# Patient Record
Sex: FEMALE | Race: BLACK OR AFRICAN AMERICAN | NOT HISPANIC OR LATINO | Employment: FULL TIME | URBAN - METROPOLITAN AREA
[De-identification: names, ages, dates, MRNs, and addresses within clinical notes are randomized per-mention and may not be internally consistent; named-entity substitution may affect disease eponyms.]

---

## 2017-01-17 ENCOUNTER — CLINICAL SUPPORT (OUTPATIENT)
Dept: SMOKING CESSATION | Facility: CLINIC | Age: 52
End: 2017-01-17
Payer: COMMERCIAL

## 2017-01-17 DIAGNOSIS — F17.200 NICOTINE DEPENDENCE: Primary | ICD-10-CM

## 2017-01-17 PROCEDURE — 99407 BEHAV CHNG SMOKING > 10 MIN: CPT | Mod: S$GLB,,,

## 2017-08-04 PROBLEM — 275978004 SCREENING FOR MALIGNANT NEOPLASM OF COLON: Status: ACTIVE | Noted: 2017-08-04

## 2017-08-04 PROBLEM — 301715003 LEFT UPPER QUADRANT PAIN: Status: ACTIVE | Noted: 2017-08-04

## 2017-08-04 PROBLEM — 40739000 DYSPHAGIA: Status: ACTIVE | Noted: 2017-08-04

## 2022-04-30 ENCOUNTER — TELEPHONE ENCOUNTER (OUTPATIENT)
Dept: URBAN - METROPOLITAN AREA CLINIC 121 | Facility: CLINIC | Age: 57
End: 2022-04-30

## 2022-04-30 RX ORDER — PHENYTOIN SODIUM EXTENDED 100 MG
CAPSULE ORAL
OUTPATIENT
Start: 2017-08-04

## 2022-04-30 RX ORDER — OMEPRAZOLE 40 MG/1
TAKE 1 CAPSULE PO QD CAPSULE, DELAYED RELEASE ORAL
OUTPATIENT
Start: 2017-08-04

## 2022-05-01 ENCOUNTER — TELEPHONE ENCOUNTER (OUTPATIENT)
Dept: URBAN - METROPOLITAN AREA CLINIC 121 | Facility: CLINIC | Age: 57
End: 2022-05-01

## 2022-05-01 RX ORDER — PHENYTOIN SODIUM EXTENDED 100 MG
CAPSULE ORAL
Status: ACTIVE | COMMUNITY
Start: 2017-08-04

## 2022-05-01 RX ORDER — OMEPRAZOLE 40 MG/1
TAKE 1 CAPSULE PO QD CAPSULE, DELAYED RELEASE ORAL
Status: ACTIVE | COMMUNITY
Start: 2017-08-04

## 2022-05-01 RX ORDER — SODIUM SULFATE, POTASSIUM SULFATE, MAGNESIUM SULFATE 17.5; 3.13; 1.6 G/ML; G/ML; G/ML
MIX AND USE AS DIRECTED SOLUTION, CONCENTRATE ORAL
Status: ACTIVE | COMMUNITY
Start: 2017-08-04

## 2022-05-01 RX ORDER — DEXTROMETHORPHAN POLISTIREX 30 MG/5ML
SUSPENSION, EXTENDED RELEASE ORAL
Status: ACTIVE | COMMUNITY
Start: 2017-08-04

## 2022-10-06 ENCOUNTER — IMMUNIZATION (OUTPATIENT)
Dept: INTERNAL MEDICINE | Facility: CLINIC | Age: 57
End: 2022-10-06
Payer: COMMERCIAL

## 2022-10-06 PROCEDURE — 90686 IIV4 VACC NO PRSV 0.5 ML IM: CPT | Mod: PBBFAC

## 2022-10-24 ENCOUNTER — OFFICE VISIT (OUTPATIENT)
Dept: INTERNAL MEDICINE | Facility: CLINIC | Age: 57
End: 2022-10-24
Payer: COMMERCIAL

## 2022-10-24 ENCOUNTER — LAB VISIT (OUTPATIENT)
Dept: LAB | Facility: HOSPITAL | Age: 57
End: 2022-10-24
Payer: COMMERCIAL

## 2022-10-24 ENCOUNTER — IMMUNIZATION (OUTPATIENT)
Dept: INTERNAL MEDICINE | Facility: CLINIC | Age: 57
End: 2022-10-24
Payer: COMMERCIAL

## 2022-10-24 VITALS
DIASTOLIC BLOOD PRESSURE: 70 MMHG | HEART RATE: 81 BPM | HEIGHT: 61 IN | WEIGHT: 194 LBS | OXYGEN SATURATION: 97 % | BODY MASS INDEX: 36.63 KG/M2 | SYSTOLIC BLOOD PRESSURE: 100 MMHG

## 2022-10-24 DIAGNOSIS — Z00.00 ENCOUNTER FOR HEALTH MAINTENANCE EXAMINATION: Primary | ICD-10-CM

## 2022-10-24 DIAGNOSIS — Z12.31 ENCOUNTER FOR SCREENING MAMMOGRAM FOR MALIGNANT NEOPLASM OF BREAST: ICD-10-CM

## 2022-10-24 DIAGNOSIS — Z12.11 COLON CANCER SCREENING: ICD-10-CM

## 2022-10-24 DIAGNOSIS — Z13.220 SCREENING CHOLESTEROL LEVEL: ICD-10-CM

## 2022-10-24 DIAGNOSIS — Z11.4 ENCOUNTER FOR SCREENING FOR HIV: ICD-10-CM

## 2022-10-24 DIAGNOSIS — Z76.89 ENCOUNTER TO ESTABLISH CARE WITH NEW DOCTOR: ICD-10-CM

## 2022-10-24 DIAGNOSIS — Z23 NEED FOR SHINGLES VACCINE: ICD-10-CM

## 2022-10-24 DIAGNOSIS — Z01.89 ROUTINE LAB DRAW: ICD-10-CM

## 2022-10-24 DIAGNOSIS — I10 HTN (HYPERTENSION), BENIGN: ICD-10-CM

## 2022-10-24 DIAGNOSIS — Z01.419 PAP TEST, AS PART OF ROUTINE GYNECOLOGICAL EXAMINATION: ICD-10-CM

## 2022-10-24 DIAGNOSIS — Z23 NEED FOR VACCINATION: Primary | ICD-10-CM

## 2022-10-24 DIAGNOSIS — R56.9 SEIZURES: ICD-10-CM

## 2022-10-24 DIAGNOSIS — Z23 NEED FOR TDAP VACCINATION: ICD-10-CM

## 2022-10-24 DIAGNOSIS — E78.2 MIXED HYPERLIPIDEMIA: ICD-10-CM

## 2022-10-24 DIAGNOSIS — Z23 NEED FOR PNEUMOCOCCAL VACCINATION: ICD-10-CM

## 2022-10-24 DIAGNOSIS — Z11.59 ENCOUNTER FOR HEPATITIS C SCREENING TEST FOR LOW RISK PATIENT: ICD-10-CM

## 2022-10-24 DIAGNOSIS — E66.9 OBESITY (BMI 35.0-39.9 WITHOUT COMORBIDITY): ICD-10-CM

## 2022-10-24 DIAGNOSIS — R13.19 OTHER DYSPHAGIA: ICD-10-CM

## 2022-10-24 DIAGNOSIS — K21.9 GASTROESOPHAGEAL REFLUX DISEASE WITHOUT ESOPHAGITIS: ICD-10-CM

## 2022-10-24 LAB
ALBUMIN SERPL BCP-MCNC: 4.4 G/DL (ref 3.5–5.2)
ALP SERPL-CCNC: 122 U/L (ref 55–135)
ALT SERPL W/O P-5'-P-CCNC: 26 U/L (ref 10–44)
ANION GAP SERPL CALC-SCNC: 13 MMOL/L (ref 8–16)
AST SERPL-CCNC: 25 U/L (ref 10–40)
BASOPHILS # BLD AUTO: 0.03 K/UL (ref 0–0.2)
BASOPHILS NFR BLD: 0.7 % (ref 0–1.9)
BILIRUB SERPL-MCNC: 0.2 MG/DL (ref 0.1–1)
BUN SERPL-MCNC: 15 MG/DL (ref 6–20)
CALCIUM SERPL-MCNC: 10 MG/DL (ref 8.7–10.5)
CHLORIDE SERPL-SCNC: 105 MMOL/L (ref 95–110)
CHOLEST SERPL-MCNC: 180 MG/DL (ref 120–199)
CHOLEST/HDLC SERPL: 2.6 {RATIO} (ref 2–5)
CO2 SERPL-SCNC: 23 MMOL/L (ref 23–29)
CREAT SERPL-MCNC: 0.7 MG/DL (ref 0.5–1.4)
DIFFERENTIAL METHOD: NORMAL
EOSINOPHIL # BLD AUTO: 0.1 K/UL (ref 0–0.5)
EOSINOPHIL NFR BLD: 1.1 % (ref 0–8)
ERYTHROCYTE [DISTWIDTH] IN BLOOD BY AUTOMATED COUNT: 12.5 % (ref 11.5–14.5)
EST. GFR  (NO RACE VARIABLE): >60 ML/MIN/1.73 M^2
GLUCOSE SERPL-MCNC: 94 MG/DL (ref 70–110)
HCT VFR BLD AUTO: 37.8 % (ref 37–48.5)
HCV AB SERPL QL IA: NORMAL
HDLC SERPL-MCNC: 69 MG/DL (ref 40–75)
HDLC SERPL: 38.3 % (ref 20–50)
HGB BLD-MCNC: 12.8 G/DL (ref 12–16)
IMM GRANULOCYTES # BLD AUTO: 0.01 K/UL (ref 0–0.04)
IMM GRANULOCYTES NFR BLD AUTO: 0.2 % (ref 0–0.5)
LDLC SERPL CALC-MCNC: 92.8 MG/DL (ref 63–159)
LYMPHOCYTES # BLD AUTO: 1.8 K/UL (ref 1–4.8)
LYMPHOCYTES NFR BLD: 41.4 % (ref 18–48)
MCH RBC QN AUTO: 30.5 PG (ref 27–31)
MCHC RBC AUTO-ENTMCNC: 33.9 G/DL (ref 32–36)
MCV RBC AUTO: 90 FL (ref 82–98)
MONOCYTES # BLD AUTO: 0.3 K/UL (ref 0.3–1)
MONOCYTES NFR BLD: 6.1 % (ref 4–15)
NEUTROPHILS # BLD AUTO: 2.2 K/UL (ref 1.8–7.7)
NEUTROPHILS NFR BLD: 50.5 % (ref 38–73)
NONHDLC SERPL-MCNC: 111 MG/DL
NRBC BLD-RTO: 0 /100 WBC
PLATELET # BLD AUTO: 245 K/UL (ref 150–450)
PMV BLD AUTO: 12.5 FL (ref 9.2–12.9)
POTASSIUM SERPL-SCNC: 3.3 MMOL/L (ref 3.5–5.1)
PROT SERPL-MCNC: 7.7 G/DL (ref 6–8.4)
RBC # BLD AUTO: 4.2 M/UL (ref 4–5.4)
SODIUM SERPL-SCNC: 141 MMOL/L (ref 136–145)
TRIGL SERPL-MCNC: 91 MG/DL (ref 30–150)
TSH SERPL DL<=0.005 MIU/L-ACNC: 0.48 UIU/ML (ref 0.4–4)
WBC # BLD AUTO: 4.4 K/UL (ref 3.9–12.7)

## 2022-10-24 PROCEDURE — 0124A PR IMMUNIZ ADMIN, SARS-COV- 2 COVID-19 VACCINE, 30MCG/0.3ML, BIVALENT, BOOSTER DOSE: CPT | Mod: PBBFAC | Performed by: NURSE PRACTITIONER

## 2022-10-24 PROCEDURE — 1160F RVW MEDS BY RX/DR IN RCRD: CPT | Mod: CPTII,S$GLB,, | Performed by: NURSE PRACTITIONER

## 2022-10-24 PROCEDURE — 3074F SYST BP LT 130 MM HG: CPT | Mod: CPTII,S$GLB,, | Performed by: NURSE PRACTITIONER

## 2022-10-24 PROCEDURE — 1159F PR MEDICATION LIST DOCUMENTED IN MEDICAL RECORD: ICD-10-PCS | Mod: CPTII,S$GLB,, | Performed by: NURSE PRACTITIONER

## 2022-10-24 PROCEDURE — 99215 OFFICE O/P EST HI 40 MIN: CPT | Mod: PBBFAC | Performed by: NURSE PRACTITIONER

## 2022-10-24 PROCEDURE — 99999 PR PBB SHADOW E&M-EST. PATIENT-LVL V: ICD-10-PCS | Mod: PBBFAC,,, | Performed by: NURSE PRACTITIONER

## 2022-10-24 PROCEDURE — 1160F PR REVIEW ALL MEDS BY PRESCRIBER/CLIN PHARMACIST DOCUMENTED: ICD-10-PCS | Mod: CPTII,S$GLB,, | Performed by: NURSE PRACTITIONER

## 2022-10-24 PROCEDURE — 85025 COMPLETE CBC W/AUTO DIFF WBC: CPT | Performed by: NURSE PRACTITIONER

## 2022-10-24 PROCEDURE — 3074F PR MOST RECENT SYSTOLIC BLOOD PRESSURE < 130 MM HG: ICD-10-PCS | Mod: CPTII,S$GLB,, | Performed by: NURSE PRACTITIONER

## 2022-10-24 PROCEDURE — 99386 PREV VISIT NEW AGE 40-64: CPT | Mod: S$GLB,,, | Performed by: NURSE PRACTITIONER

## 2022-10-24 PROCEDURE — 80061 LIPID PANEL: CPT | Performed by: NURSE PRACTITIONER

## 2022-10-24 PROCEDURE — 84443 ASSAY THYROID STIM HORMONE: CPT | Performed by: NURSE PRACTITIONER

## 2022-10-24 PROCEDURE — 80053 COMPREHEN METABOLIC PANEL: CPT | Performed by: NURSE PRACTITIONER

## 2022-10-24 PROCEDURE — 86803 HEPATITIS C AB TEST: CPT | Performed by: NURSE PRACTITIONER

## 2022-10-24 PROCEDURE — 87389 HIV-1 AG W/HIV-1&-2 AB AG IA: CPT | Performed by: NURSE PRACTITIONER

## 2022-10-24 PROCEDURE — 99999 PR PBB SHADOW E&M-EST. PATIENT-LVL V: CPT | Mod: PBBFAC,,, | Performed by: NURSE PRACTITIONER

## 2022-10-24 PROCEDURE — 91312: CPT | Mod: PBBFAC | Performed by: NURSE PRACTITIONER

## 2022-10-24 PROCEDURE — 99386 PR PREVENTIVE VISIT,NEW,40-64: ICD-10-PCS | Mod: S$GLB,,, | Performed by: NURSE PRACTITIONER

## 2022-10-24 PROCEDURE — 1159F MED LIST DOCD IN RCRD: CPT | Mod: CPTII,S$GLB,, | Performed by: NURSE PRACTITIONER

## 2022-10-24 PROCEDURE — 3078F DIAST BP <80 MM HG: CPT | Mod: CPTII,S$GLB,, | Performed by: NURSE PRACTITIONER

## 2022-10-24 PROCEDURE — 3078F PR MOST RECENT DIASTOLIC BLOOD PRESSURE < 80 MM HG: ICD-10-PCS | Mod: CPTII,S$GLB,, | Performed by: NURSE PRACTITIONER

## 2022-10-24 PROCEDURE — 0124A COVID-19, MRNA, LNP-S, BIVALENT BOOSTER, PF, 30 MCG/0.3 ML DOSE: CPT | Mod: CV19,PBBFAC | Performed by: INTERNAL MEDICINE

## 2022-10-24 PROCEDURE — 91312 COVID-19, MRNA, LNP-S, BIVALENT BOOSTER, PF, 30 MCG/0.3 ML DOSE: CPT | Mod: PBBFAC

## 2022-10-24 PROCEDURE — 36415 COLL VENOUS BLD VENIPUNCTURE: CPT | Performed by: NURSE PRACTITIONER

## 2022-10-24 RX ORDER — ATORVASTATIN CALCIUM 40 MG/1
40 TABLET, FILM COATED ORAL DAILY
Qty: 90 TABLET | Refills: 3 | Status: SHIPPED | OUTPATIENT
Start: 2022-10-24 | End: 2022-12-14 | Stop reason: SDUPTHER

## 2022-10-24 RX ORDER — PHENYTOIN SODIUM 100 MG/1
100 CAPSULE, EXTENDED RELEASE ORAL 3 TIMES DAILY
Qty: 270 CAPSULE | Refills: 3 | Status: SHIPPED | OUTPATIENT
Start: 2022-10-24 | End: 2022-12-14 | Stop reason: SDUPTHER

## 2022-10-24 RX ORDER — ATORVASTATIN CALCIUM 40 MG/1
40 TABLET, FILM COATED ORAL DAILY
COMMUNITY
Start: 2022-10-14 | End: 2022-10-24 | Stop reason: SDUPTHER

## 2022-10-24 RX ORDER — ESOMEPRAZOLE MAGNESIUM 40 MG/1
40 CAPSULE, DELAYED RELEASE ORAL
Qty: 90 CAPSULE | Refills: 3 | Status: SHIPPED | OUTPATIENT
Start: 2022-10-24 | End: 2023-03-10 | Stop reason: SDUPTHER

## 2022-10-24 RX ORDER — CHLORTHALIDONE 25 MG/1
25 TABLET ORAL DAILY
COMMUNITY
Start: 2022-09-17 | End: 2022-10-24 | Stop reason: SDUPTHER

## 2022-10-24 RX ORDER — CHLORTHALIDONE 25 MG/1
25 TABLET ORAL DAILY
Qty: 90 TABLET | Refills: 3 | Status: SHIPPED | OUTPATIENT
Start: 2022-10-24 | End: 2022-12-14 | Stop reason: SDUPTHER

## 2022-10-24 NOTE — PATIENT INSTRUCTIONS
Fasting lab orders, will call with results, if results ok, RTC in 1 yr for annual or sooner prn with one of MDs I work with who can be your new PCP: Dr. Antolin Murdock, Dr. Shukri Fields, Dr. Sarai Corrales, Dr. Sandrita Hussein, Dr. Fito Gudino, or  Dr. Howard Mendoza, or Dr. Sharda Morrison    GYN referral for pap smear    Mammogram ordered    Endoscopy will contact you to schedule your colonoscopy    PCV 20, shingles, and COVID booster discussed will get all three    Refilled requested meds    Neurology referral for seizures    Gastroenterology referral for trouble swallowing

## 2022-10-24 NOTE — PROGRESS NOTES
Subjective:       Patient ID: Adri Liriano is a 57 y.o. female.    Chief Complaint: Annual Exam    Pt new to me, here for an annual    Previous PCP was in [place]    Has had issues with constipation relative to her dilantin. Also swallowing issues. States she had an EGD 3 yrs ago in [place]. Last C-scope 5 yrs ago per pt, told to repeat in 5 yrs.    Needs refills on daily maintenance meds for cholesterol, HTN, GERD, and seizures    Needs neurology referral, was seeing a neurologist for her seizure management [place], no recent seizures, stable on dilantin    Review of Systems   Constitutional:  Negative for activity change, appetite change and unexpected weight change.   HENT:  Positive for trouble swallowing. Negative for dental problem and hearing loss.         As documented in HPI     Eyes:  Negative for visual disturbance.   Respiratory:  Negative for apnea, cough, chest tightness and shortness of breath.    Cardiovascular:  Negative for chest pain, palpitations and leg swelling.   Gastrointestinal:  Positive for constipation and reflux. Negative for abdominal distention, abdominal pain, anal bleeding, blood in stool, diarrhea, nausea, rectal pain and vomiting.        Constipation issue, takes dulcolax, the Dilantin makes her constipated--LBM 3 days ago  As documented in HPI     Endocrine: Negative for cold intolerance, heat intolerance, polydipsia, polyphagia and polyuria.   Genitourinary:  Negative for difficulty urinating, hematuria, menstrual problem, pelvic pain and vaginal pain.   Musculoskeletal:  Negative for arthralgias.   Integumentary:  Negative for color change.   Allergic/Immunologic: Negative for environmental allergies, food allergies and immunocompromised state.   Neurological:  Negative for dizziness, facial asymmetry, speech difficulty, weakness, light-headedness, numbness and headaches.   Hematological:  Negative for adenopathy. Does not bruise/bleed easily.   Psychiatric/Behavioral:   "Negative for agitation, behavioral problems, sleep disturbance and suicidal ideas.      Review of patient's allergies indicates:  No Known Allergies      Current Outpatient Medications:     atorvastatin (LIPITOR) 40 MG tablet, Take 40 mg by mouth once daily., Disp: , Rfl:     chlorthalidone (HYGROTEN) 25 MG Tab, Take 25 mg by mouth once daily., Disp: , Rfl:     esomeprazole (NEXIUM) 40 MG capsule, Take 40 mg by mouth before breakfast., Disp: , Rfl:     phenytoin (DILANTIN) 100 MG ER capsule, Take by mouth 3 (three) times daily., Disp: , Rfl:     Patient Active Problem List   Diagnosis    Seizures    GERD (gastroesophageal reflux disease)    Mixed hyperlipidemia    HTN (hypertension), benign       Past Medical History:   Diagnosis Date    Arthritis     GERD (gastroesophageal reflux disease)     HTN (hypertension), benign     Mixed hyperlipidemia     Seizures      History reviewed. No pertinent surgical history.    Social History     Socioeconomic History    Marital status:    Tobacco Use    Smoking status: Every Day    Smokeless tobacco: Never     History reviewed. No pertinent family history.        Objective:      Vitals:    10/24/22 1332   BP: 100/70   Pulse: 81   SpO2: 97%   Weight: 88 kg (194 lb 0.1 oz)   Height: 5' 1" (1.549 m)   PainSc: 0-No pain     Body mass index is 36.66 kg/m².    Physical Exam  Vitals reviewed.   Constitutional:       Appearance: She is well-developed. She is obese.   HENT:      Head: Normocephalic.      Right Ear: Hearing, tympanic membrane, ear canal and external ear normal. There is no impacted cerumen.      Left Ear: Hearing, tympanic membrane, ear canal and external ear normal. There is no impacted cerumen.      Nose: Nose normal.      Mouth/Throat:      Mouth: Mucous membranes are moist.      Pharynx: Oropharynx is clear.   Eyes:      General: Lids are normal. Lids are everted, no foreign bodies appreciated.      Extraocular Movements: Extraocular movements intact.      " Conjunctiva/sclera: Conjunctivae normal.      Pupils: Pupils are equal, round, and reactive to light.   Neck:      Vascular: No carotid bruit or JVD.      Trachea: Trachea normal.   Cardiovascular:      Rate and Rhythm: Normal rate and regular rhythm.      Pulses: Normal pulses.      Heart sounds: Normal heart sounds, S1 normal and S2 normal.   Pulmonary:      Effort: Pulmonary effort is normal.      Breath sounds: Normal breath sounds.   Abdominal:      General: Bowel sounds are normal.      Palpations: Abdomen is soft.   Musculoskeletal:         General: Normal range of motion.      Cervical back: Full passive range of motion without pain, normal range of motion and neck supple.   Skin:     General: Skin is warm and dry.      Capillary Refill: Capillary refill takes less than 2 seconds.   Neurological:      General: No focal deficit present.      Mental Status: She is alert and oriented to person, place, and time.      Deep Tendon Reflexes: Reflexes are normal and symmetric.   Psychiatric:         Mood and Affect: Mood normal.         Speech: Speech normal.         Behavior: Behavior normal.         Thought Content: Thought content normal.         Judgment: Judgment normal.       Assessment:       Problem List Items Addressed This Visit          Neuro    Seizures       Cardiac/Vascular    Mixed hyperlipidemia    HTN (hypertension), benign       GI    GERD (gastroesophageal reflux disease)     Other Visit Diagnoses       Encounter for health maintenance examination    -  Primary    Encounter for screening mammogram for malignant neoplasm of breast        Pap test, as part of routine gynecological examination        Colon cancer screening        Routine lab draw        Encounter for hepatitis C screening test for low risk patient        Encounter for screening for HIV        Screening cholesterol level        Need for Tdap vaccination        Need for shingles vaccine        Need for pneumococcal vaccination         Encounter to establish care with new doctor                Plan:       Adri was seen today for annual exam and establish care.    Diagnoses and all orders for this visit:    Encounter for health maintenance examination  Annual wellness exam completed.    All medications, histories, and concerns reviewed, reconciled, and addressed.    Appropriate Screenings per pt's sex and age have been reviewed and discussed with pt.    BMI reviewed.    Encounter for screening mammogram for malignant neoplasm of breast  -     Mammo Digital Screening Bilat; Future    Pap test, as part of routine gynecological examination  -     Ambulatory referral/consult to Gynecology; Future    Colon cancer screening  -     Ambulatory referral/consult to Endo Procedure ; Future    Routine lab draw  -     CBC Auto Differential; Future  -     Comprehensive Metabolic Panel; Future  -     Lipid Panel; Future  -     TSH; Future  -     Hepatitis C Antibody; Future  -     HIV 1/2 Ag/Ab (4th Gen); Future    Encounter for hepatitis C screening test for low risk patient  -     Hepatitis C Antibody; Future    Encounter for screening for HIV  -     HIV 1/2 Ag/Ab (4th Gen); Future    Screening cholesterol level  Check lipids    Need for Tdap vaccination  Had 3 yrs ago per pt    Need for shingles vaccine  -     (In Office Administered) Zoster Recombinant Vaccine    Need for pneumococcal vaccination  -     (In Office Administered) Pneumococcal Conjugate Vaccine (20 Valent) (IM)    Encounter to establish care with new doctor  Fasting lab orders, will call with results, if results ok, RTC in 1 yr for annual or sooner prn with one of MDs I work with who can be your new PCP: Dr. Antolin Murdock, Dr. Shukri Fields, Dr. Sarai Corrales, Dr. Sandrita Hussein, Dr. Fito Gudino, or  Dr. Howard Mendoza, or Dr. Sharda Morrison    Seizures  -     phenytoin (DILANTIN) 100 MG ER capsule; Take 1 capsule (100 mg total) by mouth 3 (three) times daily.  -     Ambulatory referral/consult  to Neurology; Future    Gastroesophageal reflux disease without esophagitis  -     esomeprazole (NEXIUM) 40 MG capsule; Take 1 capsule (40 mg total) by mouth before breakfast.  -     Ambulatory referral/consult to Gastroenterology; Future    GERD stable on meds, education on dietary triggers done    Mixed hyperlipidemia  -     atorvastatin (LIPITOR) 40 MG tablet; Take 1 tablet (40 mg total) by mouth once daily.  -     Lipid Panel; Future    Continue cholesterol med, low fat diet, and exercise. Check lipids.    HTN (hypertension), benign  -     chlorthalidone (HYGROTEN) 25 MG Tab; Take 1 tablet (25 mg total) by mouth once daily.    Take medications as prescribed.    Monitor BP at home, goal BP < or = 140/80, call office if consistently above this range.    Follow low salt DASH diet and exercise.    BMI reviewed.    Go to ED if Headaches, blurred vision, chest pain, or SOB occurs along with elevated readings > or = 160/90.    BMI 36.0-36.9,adult  BMI reviewed    Obesity (BMI 35.0-39.9 without comorbidity)  BMI reviewed.    Diet and exercise to lose weight.    Other dysphagia  -     Ambulatory referral/consult to Gastroenterology; Future    Fasting lab orders, will call with results, if results ok, RTC in 1 yr for annual or sooner prn with one of MDs I work with who can be your new PCP: Dr. Antolin Murdock, Dr. Shukri Fields, Dr. Sarai Corrales, Dr. Sandrita Hussein, Dr. Fito Gudino, or  Dr. Howard Mendoza, or Dr. Sharda Morrison    GYN referral for pap smear    Mammogram ordered    Endoscopy will contact you to schedule your colonoscopy    PCV 20, shingles, and COVID booster discussed will get all three    Refilled requested meds    Neurology referral for seizures    Gastroenterology referral for trouble swallowing    Follow up in about 1 year (around 10/24/2023) for annual or sooner as needed with one of MDs recommended on AVS.

## 2022-10-25 ENCOUNTER — TELEPHONE (OUTPATIENT)
Dept: INTERNAL MEDICINE | Facility: CLINIC | Age: 57
End: 2022-10-25
Payer: COMMERCIAL

## 2022-10-25 ENCOUNTER — HOSPITAL ENCOUNTER (OUTPATIENT)
Dept: RADIOLOGY | Facility: HOSPITAL | Age: 57
Discharge: HOME | End: 2022-10-25
Attending: NURSE PRACTITIONER
Payer: COMMERCIAL

## 2022-10-25 VITALS — HEIGHT: 61 IN | BODY MASS INDEX: 36.25 KG/M2 | WEIGHT: 192 LBS

## 2022-10-25 DIAGNOSIS — E87.6 HYPOPOTASSEMIA: Primary | ICD-10-CM

## 2022-10-25 DIAGNOSIS — Z12.31 ENCOUNTER FOR SCREENING MAMMOGRAM FOR MALIGNANT NEOPLASM OF BREAST: ICD-10-CM

## 2022-10-25 LAB — HIV 1+2 AB+HIV1 P24 AG SERPL QL IA: NORMAL

## 2022-10-25 PROCEDURE — 77063 MAMMO DIGITAL SCREENING BILAT WITH TOMO: ICD-10-PCS | Mod: 26,,, | Performed by: RADIOLOGY

## 2022-10-25 PROCEDURE — 77067 MAMMO DIGITAL SCREENING BILAT WITH TOMO: ICD-10-PCS | Mod: 26,,, | Performed by: RADIOLOGY

## 2022-10-25 PROCEDURE — 77067 SCR MAMMO BI INCL CAD: CPT | Mod: 26,,, | Performed by: RADIOLOGY

## 2022-10-25 PROCEDURE — 77063 BREAST TOMOSYNTHESIS BI: CPT | Mod: TC

## 2022-10-25 PROCEDURE — 77063 BREAST TOMOSYNTHESIS BI: CPT | Mod: 26,,, | Performed by: RADIOLOGY

## 2022-10-25 RX ORDER — POTASSIUM CHLORIDE 750 MG/1
10 CAPSULE, EXTENDED RELEASE ORAL DAILY
Qty: 30 CAPSULE | Refills: 0 | Status: SHIPPED | OUTPATIENT
Start: 2022-10-25 | End: 2022-11-24

## 2022-10-25 NOTE — TELEPHONE ENCOUNTER
----- Message from Patricia Liriano DNP sent at 10/25/2022 10:06 AM CDT -----  Regarding: Repeat potassium lab  Please schedule pt in 1 month for lab appt to recheck potassium, orders are in computer    Patricia Liriano DNP, FNP-C

## 2022-10-27 ENCOUNTER — HOSPITAL ENCOUNTER (EMERGENCY)
Facility: HOSPITAL | Age: 57
Discharge: HOME | End: 2022-10-27
Attending: EMERGENCY MEDICINE
Payer: COMMERCIAL

## 2022-10-27 VITALS
RESPIRATION RATE: 16 BRPM | WEIGHT: 192 LBS | BODY MASS INDEX: 37.69 KG/M2 | OXYGEN SATURATION: 100 % | HEIGHT: 60 IN | DIASTOLIC BLOOD PRESSURE: 70 MMHG | HEART RATE: 62 BPM | TEMPERATURE: 99 F | SYSTOLIC BLOOD PRESSURE: 138 MMHG

## 2022-10-27 DIAGNOSIS — H00.024 HORDEOLUM INTERNUM OF LEFT UPPER EYELID: Primary | ICD-10-CM

## 2022-10-27 PROCEDURE — 99284 EMERGENCY DEPT VISIT MOD MDM: CPT | Mod: ,,, | Performed by: EMERGENCY MEDICINE

## 2022-10-27 PROCEDURE — 99283 EMERGENCY DEPT VISIT LOW MDM: CPT

## 2022-10-27 PROCEDURE — 99284 PR EMERGENCY DEPT VISIT,LEVEL IV: ICD-10-PCS | Mod: ,,, | Performed by: EMERGENCY MEDICINE

## 2022-10-27 PROCEDURE — 25000003 PHARM REV CODE 250: Performed by: EMERGENCY MEDICINE

## 2022-10-27 RX ORDER — PROPARACAINE HYDROCHLORIDE 5 MG/ML
2 SOLUTION/ DROPS OPHTHALMIC
Status: COMPLETED | OUTPATIENT
Start: 2022-10-27 | End: 2022-10-27

## 2022-10-27 RX ORDER — ERYTHROMYCIN 5 MG/G
OINTMENT OPHTHALMIC
Status: COMPLETED | OUTPATIENT
Start: 2022-10-27 | End: 2022-10-27

## 2022-10-27 RX ORDER — ERYTHROMYCIN 5 MG/G
OINTMENT OPHTHALMIC
Qty: 1 G | Refills: 0 | OUTPATIENT
Start: 2022-10-27 | End: 2023-01-03

## 2022-10-27 RX ADMIN — FLUORESCEIN SODIUM 1 EACH: 1 STRIP OPHTHALMIC at 08:10

## 2022-10-27 RX ADMIN — ERYTHROMYCIN 1 INCH: 5 OINTMENT OPHTHALMIC at 09:10

## 2022-10-27 RX ADMIN — PROPARACAINE HYDROCHLORIDE 2 DROP: 5 SOLUTION/ DROPS OPHTHALMIC at 08:10

## 2022-10-27 NOTE — ED TRIAGE NOTES
Pt reports left eyelid swelling and tearing x 2 days.  Pt states that she has a hx of a stye in the same eye.  She used stye drops and warm compresses without relief.  Pt states that she feels something in her eye.  Pt denies trauma.       LOC: The patient is awake, alert and aware of environment with an appropriate affect, the patient is oriented x 3 and speaking appropriately.  APPEARANCE: Patient resting comfortably and in no acute distress, patient is clean and well groomed, patient's clothing is properly fastened.  SKIN: The skin is warm and dry, color consistent with ethnicity, patient has normal skin turgor and moist mucus membranes, skin intact, no breakdown or bruising noted.  MUSCULOSKELETAL: Patient moving all extremities spontaneously, no obvious swelling or deformities noted.  RESPIRATORY: Airway is open and patent, respirations are spontaneous, patient has a normal effort and rate, no accessory muscle use noted.  EYE: Swelling to left upper eyelid, redness to sclera and clear drainage noted.

## 2022-10-27 NOTE — ED PROVIDER NOTES
Encounter Date: 10/27/2022       History     Chief Complaint   Patient presents with    Eye Problem     Swelling noted to left upper eye lid x 2 days. Denies any vision problems.      Adri Liriano is a 57-year-old female past medical history of hypertension, hyperlipidemia, GERD presenting today for left eye pain.  Symptoms started 2 days ago, woke up with swelling of her left eyelid.  She feels like something is in the left lateral quadrant of causing irritation to the eye ball.  She reports tearing of the eye, itching of the eyelid.  Has had a stye in the past, symptoms different.  No fevers or chills.  No nasal congestion or sore throat.  Wears glasses, no contacts.  Unknown foreign body.  Denies vision changes    Review of patient's allergies indicates:  No Known Allergies  Past Medical History:   Diagnosis Date    Arthritis     GERD (gastroesophageal reflux disease)     HTN (hypertension), benign     Mixed hyperlipidemia     Seizures      Past Surgical History:   Procedure Laterality Date    UPPER GASTROINTESTINAL ENDOSCOPY  2019     Family History   Problem Relation Age of Onset    Hypertension Mother     Diabetes Mother     BRCA 1/2 Mother     No Known Problems Father      Social History     Tobacco Use    Smoking status: Every Day     Packs/day: 0.25     Years: 36.00     Pack years: 9.00     Types: Cigarettes    Smokeless tobacco: Never   Substance Use Topics    Alcohol use: Not Currently    Drug use: Never     Review of Systems   Constitutional:  Negative for chills and fever.   HENT:  Negative for sore throat.    Eyes:  Positive for pain, discharge and itching. Negative for visual disturbance.   Respiratory:  Negative for cough and shortness of breath.    Cardiovascular:  Negative for chest pain.   Gastrointestinal:  Negative for nausea.   Genitourinary:  Negative for dysuria.   Musculoskeletal:  Negative for back pain.   Skin:  Negative for rash.   Neurological:  Negative for weakness and headaches.    Hematological:  Does not bruise/bleed easily.     Physical Exam     Initial Vitals [10/27/22 0825]   BP Pulse Resp Temp SpO2   -- 86 18 98.7 °F (37.1 °C) 100 %      MAP       --         Physical Exam    Nursing note and vitals reviewed.  Constitutional: She appears well-developed and well-nourished. No distress.   HENT:   Mouth/Throat: Oropharynx is clear and moist.   Eyes: EOM are normal. Pupils are equal, round, and reactive to light. Left eye exhibits chemosis. Left eye exhibits no discharge and no exudate. Left conjunctiva is injected. No scleral icterus.   Left upper eyelid edema, no erythema.  Tearing.  Mild left lateral chemosis conjunctiva with mild injection.  Upper eyelid flipped with internal stye noted.  See picture below.  Extraocular muscles are intact.   Neck: No JVD present.   Cardiovascular:  Normal rate, regular rhythm and intact distal pulses.           Pulmonary/Chest: Breath sounds normal. No respiratory distress. She has no wheezes. She has no rales.   Abdominal: Abdomen is soft. Bowel sounds are normal. She exhibits no distension. There is no abdominal tenderness. There is no rebound.   Musculoskeletal:         General: No edema.     Lymphadenopathy:     She has no cervical adenopathy.   Neurological: She is alert and oriented to person, place, and time.   Skin: Skin is warm. No rash noted.         ED Course   Procedures  Labs Reviewed - No data to display       Imaging Results    None          Medications   erythromycin 5 mg/gram (0.5 %) ophthalmic ointment (has no administration in time range)   fluorescein ophthalmic strip 1 each (1 each Left Eye Given by Other 10/27/22 6612)   proparacaine 0.5 % ophthalmic solution 2 drop (2 drops Left Eye Given by Other 10/27/22 0846)     Medical Decision Making:   History:   Old Medical Records: I decided to obtain old medical records.  Initial Assessment:   Urgent evaluation of 57-year-old female presenting today with left eyelid swelling with  associated tearing and irritation x2 days.  Denies vision changes.    Vital signs stable  Differential Diagnosis:   Internal stye, blepharitis, foreign body, corneal abrasion, conjunctivitis  ED Management:  - exam consistent with internal stye.  - will discharge with erythromycin ointment.  - low suspicion for preseptal cellulitis as there is no erythema, only mild edema of the left upper eyelid.  - Follow-up with ophthalmology return to emergency department for vision changes.                        Clinical Impression:   Final diagnoses:  [H00.024] Hordeolum internum of left upper eyelid (Primary)      ED Disposition Condition    Discharge Stable          ED Prescriptions       Medication Sig Dispense Start Date End Date Auth. Provider    erythromycin (ROMYCIN) ophthalmic ointment Place a 1/2 inch ribbon of ointment into the left upper eyelid. 1 g 10/27/2022 -- Iva Casarez MD          Follow-up Information       Follow up With Specialties Details Why Contact Info Additional Information    [facility] - Emergency Dept Emergency Medicine  If symptoms worsen [address] [facility] [id number] [phone]     [facility] - [address] [place] [address] Ophthalmology  If symptoms worsen [address] [facility] [id number] [phone] Please arrive on the 10th floor for check-in.             Iva Casarez MD  10/27/22 0921       Iva Casarez MD  10/27/22 0921

## 2022-10-27 NOTE — Clinical Note
"Adri"Gilda Liriano was seen and treated in our emergency department on 10/27/2022.  She may return to work on 10/28/2022.       If you have any questions or concerns, please don't hesitate to call.      AILYN Liriano RN    "

## 2022-11-18 ENCOUNTER — OFFICE VISIT (OUTPATIENT)
Dept: OBSTETRICS AND GYNECOLOGY | Facility: CLINIC | Age: 57
End: 2022-11-18
Payer: COMMERCIAL

## 2022-11-18 VITALS
BODY MASS INDEX: 37.78 KG/M2 | SYSTOLIC BLOOD PRESSURE: 120 MMHG | WEIGHT: 193.44 LBS | DIASTOLIC BLOOD PRESSURE: 80 MMHG

## 2022-11-18 DIAGNOSIS — Z01.419 WELL WOMAN EXAM WITH ROUTINE GYNECOLOGICAL EXAM: Primary | ICD-10-CM

## 2022-11-18 DIAGNOSIS — Z01.419 PAP TEST, AS PART OF ROUTINE GYNECOLOGICAL EXAMINATION: ICD-10-CM

## 2022-11-18 PROCEDURE — 1159F MED LIST DOCD IN RCRD: CPT | Mod: CPTII,S$GLB,, | Performed by: OBSTETRICS & GYNECOLOGY

## 2022-11-18 PROCEDURE — 99999 PR PBB SHADOW E&M-EST. PATIENT-LVL III: ICD-10-PCS | Mod: PBBFAC,,, | Performed by: OBSTETRICS & GYNECOLOGY

## 2022-11-18 PROCEDURE — 3079F PR MOST RECENT DIASTOLIC BLOOD PRESSURE 80-89 MM HG: ICD-10-PCS | Mod: CPTII,S$GLB,, | Performed by: OBSTETRICS & GYNECOLOGY

## 2022-11-18 PROCEDURE — 3074F SYST BP LT 130 MM HG: CPT | Mod: CPTII,S$GLB,, | Performed by: OBSTETRICS & GYNECOLOGY

## 2022-11-18 PROCEDURE — 88175 CYTOPATH C/V AUTO FLUID REDO: CPT | Performed by: PATHOLOGY

## 2022-11-18 PROCEDURE — 3074F PR MOST RECENT SYSTOLIC BLOOD PRESSURE < 130 MM HG: ICD-10-PCS | Mod: CPTII,S$GLB,, | Performed by: OBSTETRICS & GYNECOLOGY

## 2022-11-18 PROCEDURE — 1159F PR MEDICATION LIST DOCUMENTED IN MEDICAL RECORD: ICD-10-PCS | Mod: CPTII,S$GLB,, | Performed by: OBSTETRICS & GYNECOLOGY

## 2022-11-18 PROCEDURE — 3008F BODY MASS INDEX DOCD: CPT | Mod: CPTII,S$GLB,, | Performed by: OBSTETRICS & GYNECOLOGY

## 2022-11-18 PROCEDURE — 99386 PR PREVENTIVE VISIT,NEW,40-64: ICD-10-PCS | Mod: S$GLB,,, | Performed by: OBSTETRICS & GYNECOLOGY

## 2022-11-18 PROCEDURE — 1160F PR REVIEW ALL MEDS BY PRESCRIBER/CLIN PHARMACIST DOCUMENTED: ICD-10-PCS | Mod: CPTII,S$GLB,, | Performed by: OBSTETRICS & GYNECOLOGY

## 2022-11-18 PROCEDURE — 99999 PR PBB SHADOW E&M-EST. PATIENT-LVL III: CPT | Mod: PBBFAC,,, | Performed by: OBSTETRICS & GYNECOLOGY

## 2022-11-18 PROCEDURE — 99386 PREV VISIT NEW AGE 40-64: CPT | Mod: S$GLB,,, | Performed by: OBSTETRICS & GYNECOLOGY

## 2022-11-18 PROCEDURE — 1160F RVW MEDS BY RX/DR IN RCRD: CPT | Mod: CPTII,S$GLB,, | Performed by: OBSTETRICS & GYNECOLOGY

## 2022-11-18 PROCEDURE — 3008F PR BODY MASS INDEX (BMI) DOCUMENTED: ICD-10-PCS | Mod: CPTII,S$GLB,, | Performed by: OBSTETRICS & GYNECOLOGY

## 2022-11-18 PROCEDURE — 3079F DIAST BP 80-89 MM HG: CPT | Mod: CPTII,S$GLB,, | Performed by: OBSTETRICS & GYNECOLOGY

## 2022-11-18 PROCEDURE — 99213 OFFICE O/P EST LOW 20 MIN: CPT | Mod: PBBFAC,PO | Performed by: OBSTETRICS & GYNECOLOGY

## 2022-11-18 PROCEDURE — 88141 PR  CYTOPATH CERV/VAG INTERPRET: ICD-10-PCS | Mod: ,,, | Performed by: PATHOLOGY

## 2022-11-18 PROCEDURE — 88141 CYTOPATH C/V INTERPRET: CPT | Mod: ,,, | Performed by: PATHOLOGY

## 2022-11-18 NOTE — PROGRESS NOTES
HPI:   57 y.o.   OB History          3    Para   3    Term   3            AB        Living             SAB        IAB        Ectopic        Multiple        Live Births                  No LMP recorded. Patient has had an ablation.    Patient is  here for her annual gynecologic exam.  She has no complaints.     ROS:  GENERAL: No fever, chills, fatigability or weight loss.  SKIN: No rashes, itching or changes in color or texture of skin.  HEAD: No headaches or recent head trauma.  EYES: Visual acuity fine. No photophobia, ocular pain or diplopia.  EARS: Denies ear pain, discharge or vertigo.  NOSE: No loss of smell, no epistaxis or postnasal drip.  MOUTH & THROAT: No hoarseness or change in voice. No excessive gum bleeding.  NODES: Denies swollen glands.  CHEST: Denies ARTEAGA, cyanosis, wheezing, cough and sputum production.  CARDIOVASCULAR: Denies chest pain, PND, orthopnea or reduced exercise tolerance.  ABDOMEN: Appetite fine. No weight loss. Denies diarrhea, abdominal pain, hematemesis or blood in stool.  URINARY: No flank pain, dysuria or hematuria.  PERIPHERAL VASCULAR: No claudication or cyanosis.  MUSCULOSKELETAL: No joint stiffness or swelling. Denies back pain.  NEUROLOGIC: No history of seizures, paralysis, alteration of gait or coordination.    PE:   /80   Wt 87.8 kg (193 lb 7.3 oz)   BMI 37.78 kg/m²   APPEARANCE: Well nourished, well developed, in no acute distress.  NECK: Neck symmetric without masses or thyromegaly.  BREASTS: Symmetrical, no skin changes or visible lesions. No palpable masses, nipple discharge or adenopathy bilaterally.  ABDOMEN: Flat. Soft. No tenderness or masses. No hepatosplenomegaly. No hernias. No CVA tenderness.  VULVA: No lesions. Normal female genitalia.  URETHRAL MEATUS: Normal size and location, no lesions, no prolapse.  URETHRA: No masses, tenderness, prolapse or scarring.  VAGINA: Moist and well rugated, no discharge, no significant cystocele or  rectocele.  CERVIX: No lesions and discharge. PAP done.  UTERUS: Normal size, regular shape, mobile, non-tender, bladder base nontender.  ADNEXA: No masses, tenderness or CDS nodularity.  ANUS PERINEUM: Normal.    PROCEDURES:  Pap smear    Assessment:  Normal Gynecologic Exam    Plan:  Mammogram and Colonoscopy if indicated by current recommendations.  Return to clinic in one year or for any problems or complaints.  Only issue is cant bm,dilantin, take dulcolox

## 2022-11-26 ENCOUNTER — HOSPITAL ENCOUNTER (EMERGENCY)
Facility: HOSPITAL | Age: 57
Discharge: HOME | End: 2022-11-26
Attending: EMERGENCY MEDICINE
Payer: MEDICAID

## 2022-11-26 VITALS
WEIGHT: 192 LBS | SYSTOLIC BLOOD PRESSURE: 147 MMHG | OXYGEN SATURATION: 99 % | RESPIRATION RATE: 16 BRPM | HEART RATE: 60 BPM | BODY MASS INDEX: 37.69 KG/M2 | DIASTOLIC BLOOD PRESSURE: 69 MMHG | TEMPERATURE: 99 F | HEIGHT: 60 IN

## 2022-11-26 DIAGNOSIS — B34.9 VIRAL SYNDROME: Primary | ICD-10-CM

## 2022-11-26 LAB
INFLUENZA A, MOLECULAR: DETECTED
INFLUENZA B, MOLECULAR: NOT DETECTED
RSV AG BY MOLECULAR METHOD: NOT DETECTED
SARS-COV-2 RNA RESP QL NAA+PROBE: NOT DETECTED

## 2022-11-26 PROCEDURE — 0241U SARS-COV2 (COVID) WITH FLU/RSV BY PCR: CPT | Performed by: PHYSICIAN ASSISTANT

## 2022-11-26 PROCEDURE — 99282 EMERGENCY DEPT VISIT SF MDM: CPT

## 2022-11-26 PROCEDURE — 99282 PR EMERGENCY DEPT VISIT,LEVEL II: ICD-10-PCS | Mod: ,,, | Performed by: PHYSICIAN ASSISTANT

## 2022-11-26 PROCEDURE — 99282 EMERGENCY DEPT VISIT SF MDM: CPT | Mod: ,,, | Performed by: PHYSICIAN ASSISTANT

## 2022-11-26 NOTE — DISCHARGE INSTRUCTIONS
You have been evaluated in the Emergency Department by a provider and have a rapid COVID test that is currently pending. Please access [facility] to review the results of your test. Until the results of your COVID test return, you should isolate yourself so as not to potentially spread illness to others.   If your COVID test returns positive, you should isolate yourself so as not to spread illness to others. After five full days, if you are feeling better and you have not had fever for 24 hours, you can return to your typical daily activities but you must wear a mask around others for an additional 5 days.   If your COVID test returns negative and you are either unvaccinated or more than six months out from your two-dose vaccine and are not yet boosted, you should still quarantine for 5 full days followed by strict mask use for an additional 5 full days.   If your COVID test returns negative and you have received your 2-dose initial vaccine as well as a booster, you should continue strict mask use for 10 full days after the exposure.  For all those exposed, best practice includes a test at day 5 after the exposure. This can be a home test or a test through one of the many testing centers throughout our community.   Masking is always advised to limit the spread of COVID. Cdc.gov is an excellent site to obtain the latest up to date recommendations regarding COVID and COVID testing.   After your evaluation today, we ruled out any emergent condition. However, if you develop shortness of breath, chest pain, or ANY OTHER CONCERNS please return to the emergency department for further care.

## 2022-11-26 NOTE — ED PROVIDER NOTES
Encounter Date: 11/26/2022       History     Chief Complaint   Patient presents with    Cough     Sneezing, nasal congestion     57-year-old female with history of seizures, hyperlipidemia, hypertension, GERD and arthritis presents emergency department for URI symptoms x4 days.  Reports nasal congestion, sneezing and chills for the past 4 days.  Reports her mother also has similar symptoms.  Patient denies any fever, shortness of breath, chest pain, nausea vomiting or dizziness.  Reports she did initially have a dry cough which has now resolved.    The history is provided by the patient.   Review of patient's allergies indicates:  No Known Allergies  Past Medical History:   Diagnosis Date    Arthritis     GERD (gastroesophageal reflux disease)     HTN (hypertension), benign     Mixed hyperlipidemia     Seizures      Past Surgical History:   Procedure Laterality Date    UPPER GASTROINTESTINAL ENDOSCOPY  2019     Family History   Problem Relation Age of Onset    Hypertension Mother     Diabetes Mother     BRCA 1/2 Mother     No Known Problems Father      Social History     Tobacco Use    Smoking status: Every Day     Packs/day: 0.25     Years: 36.00     Pack years: 9.00     Types: Cigarettes    Smokeless tobacco: Never   Substance Use Topics    Alcohol use: Not Currently    Drug use: Never     Review of Systems   Constitutional:  Positive for chills. Negative for fever.   HENT:  Positive for congestion and rhinorrhea. Negative for sore throat.    Eyes:  Negative for pain.   Respiratory:  Positive for cough. Negative for shortness of breath.    Cardiovascular:  Negative for chest pain.   Gastrointestinal:  Negative for abdominal pain, diarrhea and vomiting.   Genitourinary:  Negative for difficulty urinating and dysuria.   Musculoskeletal: Negative.    Skin: Negative.    Neurological:  Negative for weakness.   Psychiatric/Behavioral:  Negative for confusion.      Physical Exam     Initial Vitals [11/26/22 1602]   BP  Pulse Resp Temp SpO2   (!) 147/69 60 16 98.5 °F (36.9 °C) 99 %      MAP       --         Physical Exam    Nursing note and vitals reviewed.  Constitutional: She appears well-developed and well-nourished.   HENT:   Head: Normocephalic and atraumatic.   Nose: Rhinorrhea present.   Mouth/Throat: Oropharynx is clear and moist.   Eyes: Conjunctivae are normal. Pupils are equal, round, and reactive to light.   Neck: Neck supple.   Normal range of motion.  Cardiovascular:  Normal rate, regular rhythm, normal heart sounds and intact distal pulses.     Exam reveals no gallop and no friction rub.       No murmur heard.  Pulmonary/Chest: Breath sounds normal.   Abdominal: Abdomen is soft. Bowel sounds are normal. There is no abdominal tenderness.   Musculoskeletal:         General: Normal range of motion.      Cervical back: Normal range of motion and neck supple.     Neurological: She is alert and oriented to person, place, and time. GCS score is 15. GCS eye subscore is 4. GCS verbal subscore is 5. GCS motor subscore is 6.   Skin: Skin is warm and dry. Capillary refill takes less than 2 seconds.   Psychiatric: She has a normal mood and affect.       ED Course   Procedures  Labs Reviewed   SARS-COV2 (COVID) WITH FLU/RSV BY PCR          Imaging Results    None          Medications - No data to display  Medical Decision Making:   History:   Old Medical Records: I decided to obtain old medical records.  Initial Assessment:   57-year-old female presents to ED for 4 days of URI symptoms.  Differential Diagnosis:   COVID-19, influenza, viral syndrome  Clinical Tests:   Lab Tests: Ordered and Reviewed  ED Management:  Presents with 4 days of URI symptoms.  Outside of the window for influenza treatment.  Will screen with viral panel.  Discussed follow-up on [facility] portal for result.  Her vital signs are reassuring with a benign physical exam, lungs clear to auscultation bilaterally in no respiratory distress or hypoxia.  Discussed  OTC medications for symptomatic treatment.  Discussed return to ED precautions for any new or worsening symptoms.          Attending Attestation:             Attending ED Notes:   I was immediately available for consultation, but was not involved in the management of the patient.                     Clinical Impression:   Final diagnoses:  [B34.9] Viral syndrome (Primary)      ED Disposition Condition    Discharge Stable          ED Prescriptions    None       Follow-up Information       Follow up With Specialties Details Why Contact Info    [facility] - Emergency Dept Emergency Medicine Go to  If symptoms worsen [address]  [facility] [id number]  [phone]             Diego Napoles PA-C  11/26/22 1629       Franco Conrad MD  11/26/22 1955

## 2022-11-26 NOTE — ED NOTES
Pt reports cough and nasal congestion x3 days.    Pt's first and last name and birthday confirmed.   LOC: The patient is awake, alert and aware of environment with an appropriate affect, the patient is oriented x 3 and speaking appropriately.  APPEARANCE: Patient resting comfortably and in no acute distress, patient is clean and well groomed.  SKIN: The skin is warm and dry, patient has normal skin turgor and moist mucus membranes, skin intact, no breakdown or brusing noted.  MUSKULOSKELETAL: Patient moving all extremities well, no obvious swelling or deformities noted.  RESPIRATORY: Airway is open and patent, respirations are spontaneous, patient has a normal effort and rate.   NEURO: No neuro deficits, no facial droop noted. Speech is clear.

## 2022-11-28 LAB
FINAL PATHOLOGIC DIAGNOSIS: NORMAL
Lab: NORMAL

## 2022-11-30 ENCOUNTER — PATIENT MESSAGE (OUTPATIENT)
Dept: INTERNAL MEDICINE | Facility: CLINIC | Age: 57
End: 2022-11-30
Payer: MEDICAID

## 2022-12-14 ENCOUNTER — TELEPHONE (OUTPATIENT)
Dept: ENDOSCOPY | Facility: HOSPITAL | Age: 57
End: 2022-12-14
Payer: MEDICAID

## 2022-12-14 ENCOUNTER — LAB VISIT (OUTPATIENT)
Dept: LAB | Facility: HOSPITAL | Age: 57
End: 2022-12-14
Payer: MEDICAID

## 2022-12-14 ENCOUNTER — OFFICE VISIT (OUTPATIENT)
Dept: INTERNAL MEDICINE | Facility: CLINIC | Age: 57
End: 2022-12-14
Payer: MEDICAID

## 2022-12-14 VITALS
HEIGHT: 61 IN | WEIGHT: 190.69 LBS | SYSTOLIC BLOOD PRESSURE: 128 MMHG | HEART RATE: 80 BPM | OXYGEN SATURATION: 97 % | DIASTOLIC BLOOD PRESSURE: 73 MMHG | BODY MASS INDEX: 36 KG/M2

## 2022-12-14 DIAGNOSIS — E78.2 MIXED HYPERLIPIDEMIA: ICD-10-CM

## 2022-12-14 DIAGNOSIS — R56.9 SEIZURES: ICD-10-CM

## 2022-12-14 DIAGNOSIS — R13.10 DYSPHAGIA, UNSPECIFIED TYPE: ICD-10-CM

## 2022-12-14 DIAGNOSIS — I10 HTN (HYPERTENSION), BENIGN: ICD-10-CM

## 2022-12-14 DIAGNOSIS — E87.6 HYPOKALEMIA: ICD-10-CM

## 2022-12-14 DIAGNOSIS — Z76.89 ENCOUNTER TO ESTABLISH CARE: Primary | ICD-10-CM

## 2022-12-14 LAB
ANION GAP SERPL CALC-SCNC: 8 MMOL/L (ref 8–16)
BUN SERPL-MCNC: 14 MG/DL (ref 6–20)
CALCIUM SERPL-MCNC: 9.9 MG/DL (ref 8.7–10.5)
CHLORIDE SERPL-SCNC: 104 MMOL/L (ref 95–110)
CO2 SERPL-SCNC: 26 MMOL/L (ref 23–29)
CREAT SERPL-MCNC: 0.7 MG/DL (ref 0.5–1.4)
EST. GFR  (NO RACE VARIABLE): >60 ML/MIN/1.73 M^2
GLUCOSE SERPL-MCNC: 123 MG/DL (ref 70–110)
POTASSIUM SERPL-SCNC: 3.7 MMOL/L (ref 3.5–5.1)
SODIUM SERPL-SCNC: 138 MMOL/L (ref 136–145)

## 2022-12-14 PROCEDURE — 3074F PR MOST RECENT SYSTOLIC BLOOD PRESSURE < 130 MM HG: ICD-10-PCS | Mod: CPTII,,, | Performed by: INTERNAL MEDICINE

## 2022-12-14 PROCEDURE — 99213 PR OFFICE/OUTPT VISIT, EST, LEVL III, 20-29 MIN: ICD-10-PCS | Mod: S$PBB,,, | Performed by: INTERNAL MEDICINE

## 2022-12-14 PROCEDURE — 3078F PR MOST RECENT DIASTOLIC BLOOD PRESSURE < 80 MM HG: ICD-10-PCS | Mod: CPTII,,, | Performed by: INTERNAL MEDICINE

## 2022-12-14 PROCEDURE — 36415 COLL VENOUS BLD VENIPUNCTURE: CPT | Performed by: INTERNAL MEDICINE

## 2022-12-14 PROCEDURE — 99213 OFFICE O/P EST LOW 20 MIN: CPT | Mod: S$PBB,,, | Performed by: INTERNAL MEDICINE

## 2022-12-14 PROCEDURE — 3008F BODY MASS INDEX DOCD: CPT | Mod: CPTII,,, | Performed by: INTERNAL MEDICINE

## 2022-12-14 PROCEDURE — 99999 PR PBB SHADOW E&M-EST. PATIENT-LVL III: ICD-10-PCS | Mod: PBBFAC,,, | Performed by: INTERNAL MEDICINE

## 2022-12-14 PROCEDURE — 99999 PR PBB SHADOW E&M-EST. PATIENT-LVL III: CPT | Mod: PBBFAC,,, | Performed by: INTERNAL MEDICINE

## 2022-12-14 PROCEDURE — 99213 OFFICE O/P EST LOW 20 MIN: CPT | Mod: PBBFAC | Performed by: INTERNAL MEDICINE

## 2022-12-14 PROCEDURE — 3078F DIAST BP <80 MM HG: CPT | Mod: CPTII,,, | Performed by: INTERNAL MEDICINE

## 2022-12-14 PROCEDURE — 3008F PR BODY MASS INDEX (BMI) DOCUMENTED: ICD-10-PCS | Mod: CPTII,,, | Performed by: INTERNAL MEDICINE

## 2022-12-14 PROCEDURE — 80048 BASIC METABOLIC PNL TOTAL CA: CPT | Performed by: INTERNAL MEDICINE

## 2022-12-14 PROCEDURE — 3074F SYST BP LT 130 MM HG: CPT | Mod: CPTII,,, | Performed by: INTERNAL MEDICINE

## 2022-12-14 RX ORDER — PHENYTOIN SODIUM 100 MG/1
100 CAPSULE, EXTENDED RELEASE ORAL 3 TIMES DAILY
Qty: 270 CAPSULE | Refills: 3 | Status: SHIPPED | OUTPATIENT
Start: 2022-12-14 | End: 2023-07-21 | Stop reason: SDUPTHER

## 2022-12-14 RX ORDER — ATORVASTATIN CALCIUM 40 MG/1
40 TABLET, FILM COATED ORAL DAILY
Qty: 90 TABLET | Refills: 3 | Status: SHIPPED | OUTPATIENT
Start: 2022-12-14 | End: 2024-01-16 | Stop reason: SDUPTHER

## 2022-12-14 RX ORDER — CHLORTHALIDONE 25 MG/1
25 TABLET ORAL DAILY
Qty: 90 TABLET | Refills: 3 | Status: SHIPPED | OUTPATIENT
Start: 2022-12-14

## 2022-12-14 NOTE — PROGRESS NOTES
Subjective:       Patient ID: Adri Liriano is a 57 y.o. female.    Chief Complaint: Establish Care    HPI    Mrs. Liriano is 56 yo female who presents to establish care.     She is feeling well today. NO acute complaints.     GERD/Dysphagia: doing well now. Had EGD in [place] in 2015 with dilatation. Had been doing well but lately has been having issues swallowing again.   HTN: well controlled on chlorthalidone   HLD: well controlled on stain   Epilepsy:  doing well on Dilantin 100 mg TID. No seizures in years. Does not have neurologist in [place].   Current smoker: 9 pack year hx. Smokes 3-4 cigarettes per day     Health Maintenance:  Colon Cancer Screening: needs to schedule   Mammogram: Done 10/2022 and was normal. Due in 2023   HIV: negative 2022    Hep C: negative 2022   Lipids: normal in 10/2022   Pap Smear: PAP normal in 11/2022 due again in 2025   Vaccines: Tdap 8/2021, Flu due in 9/2022      Review of Systems   Constitutional:  Negative for activity change, appetite change and chills.   HENT:  Negative for ear pain, sinus pressure/congestion and sneezing.    Respiratory:  Negative for cough and shortness of breath.    Cardiovascular:  Negative for chest pain, palpitations and leg swelling.   Gastrointestinal:  Negative for abdominal distention, abdominal pain, constipation, diarrhea, nausea and vomiting.   Genitourinary:  Negative for dysuria and hematuria.   Musculoskeletal:  Negative for arthralgias, back pain and myalgias.   Neurological:  Negative for dizziness and headaches.   Psychiatric/Behavioral:  Negative for agitation. The patient is not nervous/anxious.          Past Medical History:   Diagnosis Date    Arthritis     GERD (gastroesophageal reflux disease)     HTN (hypertension), benign     Mixed hyperlipidemia     Seizures      Past Surgical History:   Procedure Laterality Date    UPPER GASTROINTESTINAL ENDOSCOPY  2019      Patient Active Problem List   Diagnosis    Seizures    GERD  (gastroesophageal reflux disease)    Mixed hyperlipidemia    HTN (hypertension), benign        Objective:      Physical Exam  Constitutional:       Appearance: Normal appearance.   HENT:      Head: Normocephalic.      Right Ear: Tympanic membrane normal.      Left Ear: Tympanic membrane normal.      Nose: Nose normal.   Cardiovascular:      Rate and Rhythm: Normal rate and regular rhythm.      Pulses: Normal pulses.      Heart sounds: Normal heart sounds.   Pulmonary:      Effort: Pulmonary effort is normal.      Breath sounds: Normal breath sounds.   Abdominal:      General: Abdomen is flat. Bowel sounds are normal.      Palpations: Abdomen is soft.   Musculoskeletal:         General: Normal range of motion.      Cervical back: Normal range of motion and neck supple.   Skin:     General: Skin is warm and dry.   Neurological:      General: No focal deficit present.      Mental Status: She is alert and oriented to person, place, and time.   Psychiatric:         Mood and Affect: Mood normal.       Assessment:       Problem List Items Addressed This Visit          Neuro    Seizures    Relevant Medications    phenytoin (DILANTIN) 100 MG ER capsule       Cardiac/Vascular    Mixed hyperlipidemia    Relevant Medications    atorvastatin (LIPITOR) 40 MG tablet    HTN (hypertension), benign    Relevant Medications    chlorthalidone (HYGROTEN) 25 MG Tab     Other Visit Diagnoses       Encounter to establish care    -  Primary    Hypokalemia        Relevant Orders    BASIC METABOLIC PANEL    Dysphagia, unspecified type        Relevant Orders    Ambulatory referral/consult to Gastroenterology            Plan:         Adri was seen today for establish care.    Diagnoses and all orders for this visit:    Encounter to establish care  Colon Cancer Screening: needs to schedule   Mammogram: Done 10/2022 and was normal. Due in 2023   HIV: negative 2022    Hep C: negative 2022   Lipids: normal in 10/2022   Pap Smear: PAP normal in  11/2022 due again in 2025   Vaccines: Tdap 8/2021, Flu due in 9/2022      Hypokalemia  Was low during October labs and was started on K replaceemtn for 4 weeks. Will repeat today     HTN (hypertension), benign  -well controlled on chlorthalidone     Seizures  Well controlled on dilantin  Needs to establish with neurology here in [place]   -     phenytoin (DILANTIN) 100 MG ER capsule; Take 1 capsule (100 mg total) by mouth 3 (three) times daily.    Mixed hyperlipidemia  -     atorvastatin (LIPITOR) 40 MG tablet; Take 1 tablet (40 mg total) by mouth once daily.    Dysphagia, unspecified type  Will refer to GI. Will try to get colonoscopy and EGD done at the same time.      Follow up in 4 months             Sandrita Hussein MD   Internal Medicine   Primary Care

## 2022-12-14 NOTE — TELEPHONE ENCOUNTER
----- Message from Shireen Coles sent at 12/14/2022 11:56 AM CST -----  Regarding: Scheduling  Please contact patient for an appointment, nothing found.  Thanks

## 2023-01-03 ENCOUNTER — HOSPITAL ENCOUNTER (EMERGENCY)
Facility: HOSPITAL | Age: 58
Discharge: HOME | End: 2023-01-03
Attending: EMERGENCY MEDICINE
Payer: MEDICAID

## 2023-01-03 VITALS
TEMPERATURE: 99 F | DIASTOLIC BLOOD PRESSURE: 80 MMHG | SYSTOLIC BLOOD PRESSURE: 146 MMHG | RESPIRATION RATE: 18 BRPM | WEIGHT: 190 LBS | BODY MASS INDEX: 34.96 KG/M2 | OXYGEN SATURATION: 97 % | HEIGHT: 62 IN | HEART RATE: 83 BPM

## 2023-01-03 DIAGNOSIS — H00.025 HORDEOLUM INTERNUM OF LEFT LOWER EYELID: Primary | ICD-10-CM

## 2023-01-03 PROCEDURE — 99283 EMERGENCY DEPT VISIT LOW MDM: CPT | Mod: ,,, | Performed by: PHYSICIAN ASSISTANT

## 2023-01-03 PROCEDURE — 25000003 PHARM REV CODE 250: Performed by: PHYSICIAN ASSISTANT

## 2023-01-03 PROCEDURE — 99283 PR EMERGENCY DEPT VISIT,LEVEL III: ICD-10-PCS | Mod: ,,, | Performed by: PHYSICIAN ASSISTANT

## 2023-01-03 PROCEDURE — 99283 EMERGENCY DEPT VISIT LOW MDM: CPT

## 2023-01-03 RX ORDER — ERYTHROMYCIN 5 MG/G
OINTMENT OPHTHALMIC EVERY 6 HOURS
Qty: 1 G | Refills: 0 | Status: SHIPPED | OUTPATIENT
Start: 2023-01-03 | End: 2023-01-03 | Stop reason: SDUPTHER

## 2023-01-03 RX ORDER — ERYTHROMYCIN 5 MG/G
OINTMENT OPHTHALMIC
Status: COMPLETED | OUTPATIENT
Start: 2023-01-03 | End: 2023-01-03

## 2023-01-03 RX ORDER — ERYTHROMYCIN 5 MG/G
OINTMENT OPHTHALMIC EVERY 6 HOURS
Qty: 3.5 G | Refills: 0 | Status: SHIPPED | OUTPATIENT
Start: 2023-01-03 | End: 2023-01-17

## 2023-01-03 RX ADMIN — ERYTHROMYCIN 1 INCH: 5 OINTMENT OPHTHALMIC at 03:01

## 2023-01-03 NOTE — Clinical Note
"Adri Oswaldbushra Liriano was seen and treated in our emergency department on 1/3/2023.  She may return to work on 01/03/2023.       If you have any questions or concerns, please don't hesitate to call.       RN    "

## 2023-01-03 NOTE — ED NOTES
LOC: The patient is awake, alert and aware of environment with an appropriate affect, the patient is oriented x 3 and speaking appropriately.  APPEARANCE: is clean and well groomed, patient's clothing is properly fastened.  SKIN: The skin is warm and dry, color consistent with ethnicity  MUSCULOSKELETAL: Patient moving all extremities spontaneously, no obvious swelling or deformities noted.  RESPIRATORY: Airway is open and patent, respirations are spontaneous, patient has a normal effort and rate  ABDOMEN: Soft and non tender to palpation, no distention noted  NEUROLOGIC:  facial expression is symmetrical, patient moving all extremities spontaneously, normal sensation in all extremities when touched with a finger.  Follows all commands appropriately.     Patient arrives from home with the complaint of a stye in her left lower eye lid. Patient states she first noticed it about 4 days ago. Moderate swelling noted to left lower eye lid. Denies any vision changes, no acute distress noted, will continue to monitor

## 2023-01-04 NOTE — ED PROVIDER NOTES
Encounter Date: 1/3/2023       History     Chief Complaint   Patient presents with    Eye Pain     Left eye pain x4 days. Tx for a stye in the same eye a couple months ago w/ resolution of symptoms. Pt reporting drainage and eye pain. Denies visual loss or changes.      The patient is a 57 year old female, who presents to the ER for an urgent evaluation due to acute atraumatic localized pain and swelling of her left lower eye lid. She states that symptoms are similar to previous episode when diagnosed and treated for a stye of the same eye/lid last year. She states that current episode began gradually 3-4 days ago and has progressively gotten worse. She states that there has been scant drainage. She denies any fever or chills. She denies any vision loss or change. She did have left over Erythromycin ophthalmic ointment from previous episode and used once then ran out. She has been using warm compresses as well.     Review of patient's allergies indicates:  No Known Allergies  Past Medical History:   Diagnosis Date    Arthritis     GERD (gastroesophageal reflux disease)     HTN (hypertension), benign     Mixed hyperlipidemia     Seizures      Past Surgical History:   Procedure Laterality Date    UPPER GASTROINTESTINAL ENDOSCOPY  2019     Family History   Problem Relation Age of Onset    Hypertension Mother     Diabetes Mother     BRCA 1/2 Mother     No Known Problems Father      Social History     Tobacco Use    Smoking status: Every Day     Packs/day: 0.25     Years: 36.00     Pack years: 9.00     Types: Cigarettes    Smokeless tobacco: Never   Substance Use Topics    Alcohol use: Not Currently    Drug use: Never     Review of Systems   Constitutional:  Negative for chills, diaphoresis and fever.   HENT:  Negative for congestion, drooling, ear pain, facial swelling and sinus pain.    Eyes:  Negative for redness and visual disturbance.   Respiratory:  Negative for shortness of breath.     Cardiovascular:  Negative for chest pain.   Gastrointestinal:  Negative for abdominal pain, diarrhea, nausea and vomiting.   Musculoskeletal:  Negative for back pain.   Skin:  Negative for color change and rash.   Allergic/Immunologic: Negative for immunocompromised state.   Neurological:  Negative for dizziness, light-headedness and headaches.   Hematological:  Negative for adenopathy.   Psychiatric/Behavioral:  Negative for confusion.      Physical Exam     Initial Vitals [01/03/23 1256]   BP Pulse Resp Temp SpO2   (!) 146/80 83 18 98.6 °F (37 °C) 97 %      MAP       --         Physical Exam    Nursing note and vitals reviewed.  Constitutional: She appears well-developed and well-nourished. She is not diaphoretic.   HENT:   Head: Normocephalic and atraumatic.   Eyes: Conjunctivae and EOM are normal. Pupils are equal, round, and reactive to light.   Localized pain and swelling of left lower lid, mild, no fluctuance or drainage currently.    Neck: Neck supple.   Cardiovascular:  Normal rate.           Pulmonary/Chest: No respiratory distress.   Abdominal: She exhibits no distension.   Musculoskeletal:         General: Normal range of motion.      Cervical back: Neck supple.     Neurological: She is alert and oriented to person, place, and time.   Skin: Skin is warm and dry. No rash noted. No erythema.   Psychiatric: She has a normal mood and affect.       ED Course   Procedures  Labs Reviewed - No data to display       Imaging Results    None          Medications   erythromycin 5 mg/gram (0.5 %) ophthalmic ointment (1 inch Left Eye Given 1/3/23 1525)     Medical Decision Making:   Initial Assessment:   58 yo female, hx of stye of left lower lid, here c/o similar episode x 3-4 days   Differential Diagnosis:   Internal hordeolum, external hordeolum, conjunctivitis, periorbital cellulitis, septal cellulitis, etc   ED Management:  Vital signs reviewed   Exam consistent with mild internal hordeolum of left lower lid    Pt denies wearing eye makeup or recent cosmetic lash procedure   Erythromycin ophthalmic ointment provided in the ER   Pt advised to wash skin with soap and water, no make up or lotion etc - and to use hot compresses   Ambulatory referral to ophthalmology ordered - pt advised to follow up in the next 2-3 days for re-check   Pt advised to return to the ER if worse in any way   Work note offered - pt works for housekeeping here in the OR at [facility] - pt declined stating that she prefers not to miss work and that stye will not inhibit her ability to perform her job                         Clinical Impression:   Final diagnoses:  [H00.025] Hordeolum internum of left lower eyelid (Primary)        ED Disposition Condition    Discharge Stable          ED Prescriptions       Medication Sig Dispense Start Date End Date Auth. Provider    erythromycin (ROMYCIN) ophthalmic ointment  (Status: Discontinued) Place into the left eye every 6 (six) hours. Place a 1/2 inch ribbon of ointment into the lower eyelid. for 5 days 1 g 1/3/2023 1/3/2023 Tremayne Chavez PA-C    erythromycin (ROMYCIN) ophthalmic ointment Place into the left eye every 6 (six) hours. Place a 1/2 inch ribbon of ointment into the lower eyelid. for 5 days 3.5 g 1/3/2023 1/17/2023 Tremayne Chavez PA-C          Follow-up Information       Follow up With Specialties Details Why Contact Info Additional Information    Sandrita Hussein MD Internal Medicine Schedule an appointment as soon as possible for a visit in 2 days  [address]  [facility] [address]  [phone]       [facility] - [address] [place] [address] Ophthalmology Schedule an appointment as soon as possible for a visit  Follow up with [facility] in the next 1-2 days for re-evaluation and further management. [address]  [facility] [id number] [phone] Please arrive on the 10th floor for check-in.    [facility] - Emergency Dept Emergency Medicine  If symptoms worsen or if  unimproved. [address]  [facility] [address]  [phone]              Tremayne Chavez PA-C  01/03/23 1914

## 2023-01-11 ENCOUNTER — OFFICE VISIT (OUTPATIENT)
Dept: OPTOMETRY | Facility: CLINIC | Age: 58
End: 2023-01-11
Payer: MEDICAID

## 2023-01-11 DIAGNOSIS — H00.025 HORDEOLUM INTERNUM OF LEFT LOWER EYELID: ICD-10-CM

## 2023-01-11 PROCEDURE — 99999 PR PBB SHADOW E&M-EST. PATIENT-LVL II: CPT | Mod: PBBFAC,,, | Performed by: OPTOMETRIST

## 2023-01-11 PROCEDURE — 1159F PR MEDICATION LIST DOCUMENTED IN MEDICAL RECORD: ICD-10-PCS | Mod: CPTII,,, | Performed by: OPTOMETRIST

## 2023-01-11 PROCEDURE — 99999 PR PBB SHADOW E&M-EST. PATIENT-LVL II: ICD-10-PCS | Mod: PBBFAC,,, | Performed by: OPTOMETRIST

## 2023-01-11 PROCEDURE — 99212 OFFICE O/P EST SF 10 MIN: CPT | Mod: PBBFAC | Performed by: OPTOMETRIST

## 2023-01-11 PROCEDURE — 92002 PR EYE EXAM, NEW PATIENT,INTERMED: ICD-10-PCS | Mod: S$PBB,,, | Performed by: OPTOMETRIST

## 2023-01-11 PROCEDURE — 92002 INTRM OPH EXAM NEW PATIENT: CPT | Mod: S$PBB,,, | Performed by: OPTOMETRIST

## 2023-01-11 PROCEDURE — 1159F MED LIST DOCD IN RCRD: CPT | Mod: CPTII,,, | Performed by: OPTOMETRIST

## 2023-01-17 NOTE — PROGRESS NOTES
HPI    58 Y/o female is here with C/o hordeolum internum left lower eyelid pt   states styes appeared about 3 wks ago and since last week stye has   subsided, pt states she was giving Ophthalmic ointment from ER used for   about 2 wks pt state OS was swollen closed, pt is present today OS is not   swollen with no bump and no change in vision   Pt denies pain and discomfort   No f/f    Eye med: no gtt   Last edited by Shantanu Mcghee MA on 1/11/2023  2:00 PM.        ROS    Negative for: Constitutional, Gastrointestinal, Neurological, Skin,   Genitourinary, Musculoskeletal, HENT, Endocrine, Cardiovascular, Eyes,   Respiratory, Psychiatric, Allergic/Imm, Heme/Lymph  Last edited by Mary Grace Randall, JENNA on 1/11/2023  2:16 PM.        Assessment /Plan     For exam results, see Encounter Report.    Hordeolum internum of left lower eyelid  -     Ambulatory referral/consult to Ophthalmology    MONITOR. ED PT ON ALL EXAM FINDINGS  IMPROVED STYE OS; ASYMPTOMATIC AT VISIT   RTC 4-6 MONTHS FOR REE/DFE

## 2023-03-15 ENCOUNTER — TELEPHONE (OUTPATIENT)
Dept: PHARMACY | Facility: CLINIC | Age: 58
End: 2023-03-15
Payer: MEDICAID

## 2023-03-15 NOTE — TELEPHONE ENCOUNTER
DOCUMENTATION ONLY  Esomeprazole Magnesium 40mg DR capsules  Approval date: 3/14/2023 to 3/14/2024   Case ID# [id number]

## 2023-04-21 ENCOUNTER — PATIENT MESSAGE (OUTPATIENT)
Dept: ADMINISTRATIVE | Facility: HOSPITAL | Age: 58
End: 2023-04-21
Payer: MEDICAID

## 2023-07-21 ENCOUNTER — OFFICE VISIT (OUTPATIENT)
Dept: NEUROLOGY | Facility: CLINIC | Age: 58
End: 2023-07-21
Payer: MEDICAID

## 2023-07-21 VITALS
HEIGHT: 62 IN | SYSTOLIC BLOOD PRESSURE: 161 MMHG | BODY MASS INDEX: 34.98 KG/M2 | DIASTOLIC BLOOD PRESSURE: 86 MMHG | WEIGHT: 190.06 LBS | HEART RATE: 73 BPM

## 2023-07-21 DIAGNOSIS — G40.909 SEIZURE DISORDER: Primary | ICD-10-CM

## 2023-07-21 PROCEDURE — 3079F DIAST BP 80-89 MM HG: CPT | Mod: CPTII,,, | Performed by: PSYCHIATRY & NEUROLOGY

## 2023-07-21 PROCEDURE — 99999 PR PBB SHADOW E&M-EST. PATIENT-LVL III: CPT | Mod: PBBFAC,,, | Performed by: PSYCHIATRY & NEUROLOGY

## 2023-07-21 PROCEDURE — 3008F BODY MASS INDEX DOCD: CPT | Mod: CPTII,,, | Performed by: PSYCHIATRY & NEUROLOGY

## 2023-07-21 PROCEDURE — 99205 PR OFFICE/OUTPT VISIT, NEW, LEVL V, 60-74 MIN: ICD-10-PCS | Mod: S$PBB,,, | Performed by: PSYCHIATRY & NEUROLOGY

## 2023-07-21 PROCEDURE — 3077F PR MOST RECENT SYSTOLIC BLOOD PRESSURE >= 140 MM HG: ICD-10-PCS | Mod: CPTII,,, | Performed by: PSYCHIATRY & NEUROLOGY

## 2023-07-21 PROCEDURE — 3079F PR MOST RECENT DIASTOLIC BLOOD PRESSURE 80-89 MM HG: ICD-10-PCS | Mod: CPTII,,, | Performed by: PSYCHIATRY & NEUROLOGY

## 2023-07-21 PROCEDURE — 99205 OFFICE O/P NEW HI 60 MIN: CPT | Mod: S$PBB,,, | Performed by: PSYCHIATRY & NEUROLOGY

## 2023-07-21 PROCEDURE — 99213 OFFICE O/P EST LOW 20 MIN: CPT | Mod: PBBFAC | Performed by: PSYCHIATRY & NEUROLOGY

## 2023-07-21 PROCEDURE — 3077F SYST BP >= 140 MM HG: CPT | Mod: CPTII,,, | Performed by: PSYCHIATRY & NEUROLOGY

## 2023-07-21 PROCEDURE — 1159F MED LIST DOCD IN RCRD: CPT | Mod: CPTII,,, | Performed by: PSYCHIATRY & NEUROLOGY

## 2023-07-21 PROCEDURE — 99999 PR PBB SHADOW E&M-EST. PATIENT-LVL III: ICD-10-PCS | Mod: PBBFAC,,, | Performed by: PSYCHIATRY & NEUROLOGY

## 2023-07-21 PROCEDURE — 1159F PR MEDICATION LIST DOCUMENTED IN MEDICAL RECORD: ICD-10-PCS | Mod: CPTII,,, | Performed by: PSYCHIATRY & NEUROLOGY

## 2023-07-21 PROCEDURE — 1160F PR REVIEW ALL MEDS BY PRESCRIBER/CLIN PHARMACIST DOCUMENTED: ICD-10-PCS | Mod: CPTII,,, | Performed by: PSYCHIATRY & NEUROLOGY

## 2023-07-21 PROCEDURE — 1160F RVW MEDS BY RX/DR IN RCRD: CPT | Mod: CPTII,,, | Performed by: PSYCHIATRY & NEUROLOGY

## 2023-07-21 PROCEDURE — 3008F PR BODY MASS INDEX (BMI) DOCUMENTED: ICD-10-PCS | Mod: CPTII,,, | Performed by: PSYCHIATRY & NEUROLOGY

## 2023-07-21 RX ORDER — LEVETIRACETAM 1000 MG/1
1000 TABLET ORAL 2 TIMES DAILY
Qty: 60 TABLET | Refills: 11 | Status: SHIPPED | OUTPATIENT
Start: 2023-07-21 | End: 2023-11-10 | Stop reason: SDUPTHER

## 2023-07-21 RX ORDER — PHENYTOIN SODIUM 100 MG/1
CAPSULE, EXTENDED RELEASE ORAL
Qty: 42 CAPSULE | Refills: 0 | Status: SHIPPED | OUTPATIENT
Start: 2023-07-21 | End: 2023-11-10

## 2023-07-21 NOTE — PATIENT INSTRUCTIONS
Week 1- Take both medications as prescribed  Week 2- Take Keppra as prescribed; reduce dilantin to TWICE daily  Week 3- Take Keppra as prescribed; reduce dilantin to ONCE daily  Week 4- Take Keppra only; stop dilantin

## 2023-07-21 NOTE — PROGRESS NOTES
[facility] - NEUROLOGY [address]TH [place] [facility], [place]    Date: 7/21/23  Patient Name: Adri Liriano   MRN: [id number]   PCP: Sandrita Hussein  Referring Provider: Patricia Liriano DNP    Chief Complaint:  History of epilepsy  Subjective:   Patient seen in consultation at the request of Patricia Liriano DNP for the evaluation of history of epilepsy. A copy of this note will be sent to the referring physician.      HPI:   Ms. Adri Liriano is a 58 y.o. right-handed female presenting to [facility] care for history of epilepsy.      Age of seizure onset:  16   Most recent seizure:  4-5 years ago  Aura:  None    Seizure semiology:  1. No aura or warning, sudden onset loss of consciousness with stiffening and jerking of the limbs lasting for minutes.  Has sometimes involved bladder incontinence.  No tongue biting.    2. Staring episodes with loss of awareness    Current medication:  Phenytoin extended release 100 mg t.i.d.  Medications failed:  None    Most recent EEG:  Unsure, none in our system  Most recent brain imaging:  MRI brain 2012, no images available for review but report notes cerebellar atrophy    Denies side effects with current medication.  Specifically denied any recent falls, problems with ataxia, tremor, vision changes, problems swallowing, changes in speech, changes in gumline, palpitations or chest pain.  Has had CBC and CMP completed within the last year with no relevant findings.  No recent Dilantin level.    PAST MEDICAL HISTORY:  Past Medical History:   Diagnosis Date    Arthritis     GERD (gastroesophageal reflux disease)     HTN (hypertension), benign     Mixed hyperlipidemia     Seizures        PAST SURGICAL HISTORY:  Past Surgical History:   Procedure Laterality Date    UPPER GASTROINTESTINAL ENDOSCOPY  2019       CURRENT MEDS:  Current Outpatient Medications   Medication Sig Dispense Refill    atorvastatin (LIPITOR) 40 MG tablet Take 1 tablet (40 mg total)  "by mouth once daily. 90 tablet 3    chlorthalidone (HYGROTEN) 25 MG Tab Take 1 tablet (25 mg total) by mouth once daily. 90 tablet 3    esomeprazole (NEXIUM) 40 MG capsule Take 1 capsule (40 mg total) by mouth before breakfast. 90 capsule 3    levETIRAcetam (KEPPRA) 1000 MG tablet Take 1 tablet (1,000 mg total) by mouth 2 (two) times daily. 60 tablet 11    phenytoin (DILANTIN) 100 MG ER capsule Take 1 capsule (100 mg total) by mouth 3 (three) times daily for 7 days, THEN 1 capsule (100 mg total) 2 (two) times daily for 7 days, THEN 1 capsule (100 mg total) once daily for 7 days. 42 capsule 0     No current facility-administered medications for this visit.       ALLERGIES:  Review of patient's allergies indicates:  No Known Allergies    FAMILY HISTORY:  Family History   Problem Relation Age of Onset    Hypertension Mother     Diabetes Mother     BRCA 1/2 Mother     No Known Problems Father        SOCIAL HISTORY:  Social History     Tobacco Use    Smoking status: Every Day     Packs/day: 0.25     Years: 36.00     Pack years: 9.00     Types: Cigarettes    Smokeless tobacco: Never   Substance Use Topics    Alcohol use: Not Currently    Drug use: Never       Review of Systems:  Gen: no fever, no chills, no generalized feeling of weakness   HEENT: no double vision, no blurred vision, no eye pain, no eye exudates. no nasal congestion,   no traumatic injury of head, no neck pain, no neck stiffness. no photophobia or phonophobia at this time. ?    Heart: no chest pain, no SOB    Lungs: no SOB, no cough    MSK: no weakness of legs, intact ROM    ABD: no abd pain, no N/V/D/C, no difficulty with defecation.    Extremities: No leg pain, no edema.       Objective:     Vitals:    07/21/23 0958   BP: (!) 161/86   BP Location: Left arm   Patient Position: Sitting   Pulse: 73   Weight: 86.2 kg (190 lb 0.6 oz)   Height: 5' 2" (1.575 m)       General: female in NAD, alert and awake, Aox3, well groomed. ?    ? ?    HEENT: Head is NC/AT " EOMI, pupil size: 4 mm B/L, no nystagmus noted; hearing grossly intact b/l. Mucous membrane moist, uvula midline, no pharyngeal erythema, exudates or discharges.   No gingival hyperplasia     Neck: Supple. no nuchal rigidity.      Cardiovascular: well perfused, no cyanosis        Respiratory: Symmetric chest rise noted       Musculoskeletal: Muscle tone noted to be adequate for patient age, muscle mass is WNL. No spontaneous movements or fasciculations noted during this examination.       Extremities: No pedal edema or calf tenderness. No cogwheel rigidity noted on B/L UE extremities.       Neurological Examination.    Mental status: AA&O x3; Affect/mood is euthymic/congruent; no aphasia noted during examination. Patient answers simple questions appropriately & follows simple commands; no dysarthria or expressive aphasia; no elba-neglect or extinction. Vocabulary/word finding: excellent.       Cranial Nerves: II-XII grossly intact. visual fields intact to confrontation, EOMI, no nystagmus, PERRLA,?facial muscles symmetric and no facial droop noted, patient hearing is grossly intact b/l, ?facial sensation to sharp and light touch grossly intact B/L. Patient smiles, frowns, closes eyes ?forcefully uneventfully. Uvula midline and no difficulty with pronunciation. No SCM/trapezius/muscle of mastication weakness noted B/L. Patient has adequate control of tongue and may protrude it and move it adequately.       Muscle Function: Tone WNL and Muscle bulk WNL. Left elbow flexors/extensors (5/5), Left shoulder (5/5); left Finger flexor/extensors (5/5). Right elbow flexors/extensors (5/5). Right shoulder abduction/flexion (5/5), Right finger flexors/extensors (5/5). Left LE hip flexion/extension (5/5), Left Knee flexion/extension (5/5) and Left ankle flexion/extension/Eversion/inversion (5/5). Right LE hip flexion/extension (5/5), Right Knee flexion/extension (5/5) and ankle flexion/extension/Eversion/inversion (5/5).        Sensory: ?Intact to light touch throughout      Reflexes: Left and Right biceps, triceps, brachioradialis are 2+/4. patellar 2+/4 on Left and 2+/4 on Right, B/L Achilles 2+/4     Coordination: no dysmetria (finger to nose negative)     Gait: adequate casual gait with stride length and arm swing WNL. Tandem gait and walking on toes and heels are WNL     Rhomberg: negative      Assessment:   Adri Liriano is a 58 y.o. female presenting to [facility] care for history of seizure disorder.  We had an extensive conversation regarding her long-term use of Dilantin and the possible side effects were changes that can be related with long-term therapy with this medication.  As she has already been identified as having some degree of cerebellar atrophy on imaging (no images available for review today, this observation was noted by Radiology in 2012 scan), the patient expressed openness to consider other medications.  We discussed benefits, risks, side effects, alternatives to Keppra and agreed to proceed with a gentle transition to treatment with this medication.  Extensive safety counseling was provided regarding seizures in general and the increased risk of breakthrough events around the time of transitioning medications.  The patient verbally expressed understanding of the risk and adds that she does not drive.    Plan:     1. Seizure disorder  Ambulatory referral/consult to Neurology    levETIRAcetam (KEPPRA) 1000 MG tablet    phenytoin (DILANTIN) 100 MG ER capsule        - start: Keppra 1000 mg twice daily   - benefits, risks, side effects, alternatives discussed at length   - patient will take Keppra and Dilantin concurrently for 1 week then decrease Dilantin dosing by 100 mg each week until medication is DISCONTINUED; these instructions were provided on after visit summary and on phenytoin refill prescription  - Seizure safety discussed extensively including avoidance of risky situations, large bodies of water,  swimming alone, baths. We also discussed avoiding driving or operating other heavy machinery for 6 months after a breakthrough event in the [facility].   - Advised the patient to avoid seizure provoking behaviors including excessive alcohol consumption, sleep deprivation, and medication noncompliance.   - specifically counseled on risk of breakthrough activity while transitioning medications  - patient was repeatedly counseled to reach out to our clinic immediately if questions or concerns arise.  We will plan for 3 month routine follow-up for now.  If symptoms remain well controlled during this time frame, we will likely returned to routine annual followups.    I spent a total of 60 minutes on the day of the visit. This includes face to face time and non-face to face time preparing to see the patient (eg, review of tests), obtaining and/or reviewing separately obtained history, documenting clinical information in the electronic or other health record, independently interpreting results and communicating results to the patient/family/caregiver, or care coordinator.    A dictation device was used to produce this document. Use of such devices sometimes results in grammatical errors or replacement of words that sound similarly.    Norberto Pepe, DO

## 2023-08-11 DIAGNOSIS — G40.909 SEIZURE DISORDER: ICD-10-CM

## 2023-08-14 RX ORDER — PHENYTOIN SODIUM 100 MG/1
CAPSULE, EXTENDED RELEASE ORAL
Qty: 42 CAPSULE | Refills: 0 | OUTPATIENT
Start: 2023-08-14 | End: 2023-09-03

## 2023-08-15 ENCOUNTER — HOSPITAL ENCOUNTER (EMERGENCY)
Facility: HOSPITAL | Age: 58
Discharge: HOME | End: 2023-08-15
Attending: EMERGENCY MEDICINE
Payer: MEDICAID

## 2023-08-15 VITALS
OXYGEN SATURATION: 100 % | RESPIRATION RATE: 16 BRPM | TEMPERATURE: 98 F | HEART RATE: 74 BPM | HEIGHT: 60 IN | DIASTOLIC BLOOD PRESSURE: 83 MMHG | BODY MASS INDEX: 35.93 KG/M2 | WEIGHT: 183 LBS | SYSTOLIC BLOOD PRESSURE: 141 MMHG

## 2023-08-15 DIAGNOSIS — R05.9 COUGH: ICD-10-CM

## 2023-08-15 DIAGNOSIS — J20.9 ACUTE BRONCHITIS, UNSPECIFIED ORGANISM: Primary | ICD-10-CM

## 2023-08-15 LAB — SARS-COV-2 RDRP RESP QL NAA+PROBE: NEGATIVE

## 2023-08-15 PROCEDURE — 25000003 PHARM REV CODE 250

## 2023-08-15 PROCEDURE — 99283 EMERGENCY DEPT VISIT LOW MDM: CPT | Mod: 25

## 2023-08-15 PROCEDURE — U0002 COVID-19 LAB TEST NON-CDC: HCPCS

## 2023-08-15 RX ORDER — BENZONATATE 100 MG/1
100 CAPSULE ORAL 3 TIMES DAILY PRN
Qty: 20 CAPSULE | Refills: 0 | Status: SHIPPED | OUTPATIENT
Start: 2023-08-15

## 2023-08-15 RX ORDER — BENZONATATE 100 MG/1
100 CAPSULE ORAL ONCE
Status: COMPLETED | OUTPATIENT
Start: 2023-08-15 | End: 2023-08-15

## 2023-08-15 RX ORDER — BENZONATATE 100 MG/1
100 CAPSULE ORAL 3 TIMES DAILY PRN
Qty: 20 CAPSULE | Refills: 0 | Status: SHIPPED | OUTPATIENT
Start: 2023-08-15 | End: 2023-08-15 | Stop reason: SDUPTHER

## 2023-08-15 RX ADMIN — BENZONATATE 100 MG: 100 CAPSULE ORAL at 08:08

## 2023-08-15 NOTE — ED NOTES
"Patient states she has a " bad cough" states tingling in her throat resultsing in gagging, also reports pain in both ears x 2 weeks  "

## 2023-08-15 NOTE — ED NOTES
Patient identifiers verified and correct for  MS Deandra  C/C:  Cough, shaw ear pain SEE NN  APPEARANCE: awake and alert in NAD. PAIN  8/10  SKIN: warm, dry and intact. No breakdown or bruising.  MUSCULOSKELETAL: Patient moving all extremities spontaneously, no obvious swelling or deformities noted. Ambulates independently.  RESPIRATORY: Denies shortness of breath.Respirations unlabored. Reports dry cough and shaw ear pain   CARDIAC: Denies CP, 2+ distal pulses; no peripheral edema  ABDOMEN: S/ND/NT, Denies nausea  : voids spontaneously, denies difficulty  Neurologic: AAO x 4; follows commands equal strength in all extremities; denies numbness/tingling. Denies dizziness  Denei snew wekaness,

## 2023-08-16 NOTE — ED PROVIDER NOTES
Encounter Date: 8/15/2023       History     Chief Complaint   Patient presents with    Cough     X 2 weeks.     Otalgia     Bilateral ears x 2 weeks      58-year-old female history of GERD and hypertension presents to emergency department due to ongoing cough for the last 2 weeks.  The patient also endorses bilateral ear discomfort.  Patient reports the cough is nonproductive however excessive. Patient reports she was initially seen in Ed at onset was given cough suppressants however she no longer has any.  She is not experiencing fever and chills.  No diarrhea.  No chest pain or shortness a breath      Review of patient's allergies indicates:  No Known Allergies  Past Medical History:   Diagnosis Date    Arthritis     GERD (gastroesophageal reflux disease)     HTN (hypertension), benign     Mixed hyperlipidemia     Seizures      Past Surgical History:   Procedure Laterality Date    UPPER GASTROINTESTINAL ENDOSCOPY  2019     Family History   Problem Relation Age of Onset    Hypertension Mother     Diabetes Mother     BRCA 1/2 Mother     No Known Problems Father      Social History     Tobacco Use    Smoking status: Every Day     Current packs/day: 0.25     Average packs/day: 0.3 packs/day for 36.0 years (9.0 ttl pk-yrs)     Types: Cigarettes    Smokeless tobacco: Never   Substance Use Topics    Alcohol use: Not Currently    Drug use: Never     Review of Systems   Constitutional:  Negative for chills and fever.   HENT:  Positive for congestion and ear pain.    Respiratory:  Positive for cough. Negative for shortness of breath.    Musculoskeletal:  Negative for myalgias.       Physical Exam     Initial Vitals [08/15/23 1816]   BP Pulse Resp Temp SpO2   (!) 170/76 76 20 98.4 °F (36.9 °C) 96 %      MAP       --         Physical Exam    Vitals reviewed.  Constitutional: She appears well-developed and well-nourished.   HENT:   Head: Normocephalic and atraumatic.   Right Ear: Hearing, tympanic membrane, external ear and  "ear canal normal.   Left Ear: Hearing, tympanic membrane, external ear and ear canal normal.   Eyes: Conjunctivae and EOM are normal.   Neck:   Normal range of motion.  Cardiovascular:  Normal rate.           Pulmonary/Chest: Breath sounds normal. No respiratory distress. She has no wheezes. She has no rales.   Abdominal: Abdomen is soft. She exhibits no distension. There is no abdominal tenderness. There is no rebound.   Musculoskeletal:         General: Normal range of motion.      Cervical back: Normal range of motion.     Neurological: She is alert and oriented to person, place, and time.   Skin: Skin is warm and dry.   Psychiatric: She has a normal mood and affect. Thought content normal.         ED Course   Procedures  Labs Reviewed   SARS-COV-2 RNA AMPLIFICATION, QUAL          Imaging Results              X-Ray Chest PA And Lateral (Final result)  Result time 08/15/23 21:11:04      Final result by Juan C Deleon MD (08/15/23 21:11:04)                   Impression:      Mild enlargement of the cardiomediastinal silhouette and suspected central vascular congestion.  No large focal consolidation.      Electronically signed by: Juan C Deleon MD  Date:    08/15/2023  Time:    21:11               Narrative:    EXAMINATION:  XR CHEST PA AND LATERAL    CLINICAL HISTORY:  Provided history is "  Cough, unspecified".    TECHNIQUE:  Frontal and lateral views of the chest were performed.    COMPARISON:  03/17/2012.    FINDINGS:  Cardiomediastinal silhouette is mildly enlarged.  Mild central vascular prominence.  Minimal perihilar interstitial lung markings.  No confluent area of consolidation.  No sizable pleural effusion.  No pneumothorax.                                       Medications   benzonatate capsule 100 mg (100 mg Oral Given 8/15/23 2015)     Medical Decision Making:   Differential Diagnosis:    Upper respiratory infection, acute bronchitis  will also rule out pneumonia  ED Management:   58-year-old " female presents with continued cough over the last 2 weeks.   She also experiencing discomfort in her ears bilaterally however based on exam no concern of acute otitis media or externa.   CXR without concerns of lower respiratory infection.  However incidental findings of mildly enlarged heart recommend patient follow  up with PCP regarding findings   Patient was given cough suppressant in ED and reports improvement.  COVID was also negative   Patient discharged home, also discussed additional at home symptomatic management for relief of symptoms- return precautions discussed  Pt discussed with supervising physician                            Clinical Impression:   Final diagnoses:  [R05.9] Cough  [J20.9] Acute bronchitis, unspecified organism (Primary)        ED Disposition Condition    Discharge Stable          ED Prescriptions       Medication Sig Dispense Start Date End Date Auth. Provider    benzonatate (TESSALON) 100 MG capsule  (Status: Discontinued) Take 1 capsule (100 mg total) by mouth 3 (three) times daily as needed for Cough. 20 capsule 8/15/2023 8/15/2023 Janel Rapp PA-C    benzonatate (TESSALON) 100 MG capsule Take 1 capsule (100 mg total) by mouth 3 (three) times daily as needed for Cough. 20 capsule 8/15/2023 -- Janel Rapp PA-C          Follow-up Information       Follow up With Specialties Details Why Contact Info    Sandrita Hussein MD Internal Medicine   [address] [place] [address] [id number] [phone]               Janel Rapp PA-C  08/16/23 0046

## 2023-08-16 NOTE — DISCHARGE INSTRUCTIONS
As discussed your chronic cough is due to a virus or just inflammation of the throat  You can during warmth he is for relief along with taking cough suppressant medication however this does not guarantee dysuria cough  Chest x-ray was not concerning for an infection, however I recommend that you follow up with the primary care provider regarding findings as heart is slightly enlarged.  If you develop fever diarrhea nausea vomiting become shortness of breath you can return to the emergency department for re-evaluation

## 2023-09-14 ENCOUNTER — PATIENT MESSAGE (OUTPATIENT)
Dept: ADMINISTRATIVE | Facility: OTHER | Age: 58
End: 2023-09-14
Payer: MEDICAID

## 2023-09-14 DIAGNOSIS — G40.909 SEIZURE DISORDER: ICD-10-CM

## 2023-09-14 RX ORDER — PHENYTOIN SODIUM 100 MG/1
CAPSULE, EXTENDED RELEASE ORAL
Qty: 42 CAPSULE | Refills: 0 | OUTPATIENT
Start: 2023-09-14 | End: 2023-10-04

## 2023-09-15 ENCOUNTER — PATIENT MESSAGE (OUTPATIENT)
Dept: ADMINISTRATIVE | Facility: OTHER | Age: 58
End: 2023-09-15
Payer: MEDICAID

## 2023-09-19 ENCOUNTER — TELEPHONE (OUTPATIENT)
Dept: INTERNAL MEDICINE | Facility: CLINIC | Age: 58
End: 2023-09-19
Payer: MEDICAID

## 2023-09-19 ENCOUNTER — PATIENT MESSAGE (OUTPATIENT)
Dept: INTERNAL MEDICINE | Facility: CLINIC | Age: 58
End: 2023-09-19
Payer: MEDICAID

## 2023-09-19 ENCOUNTER — LAB VISIT (OUTPATIENT)
Dept: LAB | Facility: HOSPITAL | Age: 58
End: 2023-09-19
Payer: MEDICAID

## 2023-09-19 DIAGNOSIS — E78.49 OTHER HYPERLIPIDEMIA: ICD-10-CM

## 2023-09-19 DIAGNOSIS — E87.6 HYPOKALEMIA: Primary | ICD-10-CM

## 2023-09-19 LAB
ALBUMIN SERPL BCP-MCNC: 4.2 G/DL (ref 3.5–5.2)
ALP SERPL-CCNC: 109 U/L (ref 55–135)
ALT SERPL W/O P-5'-P-CCNC: 26 U/L (ref 10–44)
ANION GAP SERPL CALC-SCNC: 9 MMOL/L (ref 8–16)
AST SERPL-CCNC: 27 U/L (ref 10–40)
BILIRUB SERPL-MCNC: 0.4 MG/DL (ref 0.1–1)
BUN SERPL-MCNC: 13 MG/DL (ref 6–20)
CALCIUM SERPL-MCNC: 10.1 MG/DL (ref 8.7–10.5)
CHLORIDE SERPL-SCNC: 104 MMOL/L (ref 95–110)
CO2 SERPL-SCNC: 27 MMOL/L (ref 23–29)
CREAT SERPL-MCNC: 0.8 MG/DL (ref 0.5–1.4)
EST. GFR  (NO RACE VARIABLE): >60 ML/MIN/1.73 M^2
GLUCOSE SERPL-MCNC: 150 MG/DL (ref 70–110)
POTASSIUM SERPL-SCNC: 3 MMOL/L (ref 3.5–5.1)
PROT SERPL-MCNC: 7.7 G/DL (ref 6–8.4)
SODIUM SERPL-SCNC: 140 MMOL/L (ref 136–145)
TSH SERPL DL<=0.005 MIU/L-ACNC: 0.48 UIU/ML (ref 0.4–4)

## 2023-09-19 PROCEDURE — 36415 COLL VENOUS BLD VENIPUNCTURE: CPT | Performed by: INTERNAL MEDICINE

## 2023-09-19 PROCEDURE — 80053 COMPREHEN METABOLIC PANEL: CPT | Performed by: INTERNAL MEDICINE

## 2023-09-19 PROCEDURE — 84443 ASSAY THYROID STIM HORMONE: CPT | Performed by: INTERNAL MEDICINE

## 2023-09-20 ENCOUNTER — TELEPHONE (OUTPATIENT)
Dept: INTERNAL MEDICINE | Facility: CLINIC | Age: 58
End: 2023-09-20
Payer: MEDICAID

## 2023-09-20 NOTE — TELEPHONE ENCOUNTER
----- Message from Sandrita Hussein MD sent at 9/20/2023  8:08 AM CDT -----  Please help her schedule an appt for follow up next week. Thanks   ----- Message -----  From: [facility]  Sent: 9/19/2023   9:12 PM CDT  To: Sandrita Hussein MD

## 2023-09-20 NOTE — TELEPHONE ENCOUNTER
----- Message from Sandrita Hussein MD sent at 9/20/2023  8:08 AM CDT -----  Please help her schedule an appt for follow up next week. Thanks   ----- Message -----  From: [facility]  Sent: 9/19/2023   9:12 PM CDT  To: Sandrita Hussein MD

## 2023-09-21 ENCOUNTER — TELEPHONE (OUTPATIENT)
Dept: INTERNAL MEDICINE | Facility: CLINIC | Age: 58
End: 2023-09-21
Payer: MEDICAID

## 2023-09-28 ENCOUNTER — OFFICE VISIT (OUTPATIENT)
Dept: INTERNAL MEDICINE | Facility: CLINIC | Age: 58
End: 2023-09-28
Payer: MEDICAID

## 2023-09-28 ENCOUNTER — TELEPHONE (OUTPATIENT)
Dept: INTERNAL MEDICINE | Facility: CLINIC | Age: 58
End: 2023-09-28

## 2023-09-28 ENCOUNTER — PATIENT MESSAGE (OUTPATIENT)
Dept: ADMINISTRATIVE | Facility: OTHER | Age: 58
End: 2023-09-28
Payer: MEDICAID

## 2023-09-28 ENCOUNTER — LAB VISIT (OUTPATIENT)
Dept: LAB | Facility: HOSPITAL | Age: 58
End: 2023-09-28
Payer: MEDICAID

## 2023-09-28 VITALS
HEIGHT: 60 IN | HEART RATE: 85 BPM | BODY MASS INDEX: 39.3 KG/M2 | DIASTOLIC BLOOD PRESSURE: 80 MMHG | WEIGHT: 200.19 LBS | SYSTOLIC BLOOD PRESSURE: 138 MMHG | OXYGEN SATURATION: 99 %

## 2023-09-28 DIAGNOSIS — I10 HTN (HYPERTENSION), BENIGN: ICD-10-CM

## 2023-09-28 DIAGNOSIS — Z12.12 ENCOUNTER FOR COLORECTAL CANCER SCREENING: ICD-10-CM

## 2023-09-28 DIAGNOSIS — I51.7 CARDIOMEGALY: ICD-10-CM

## 2023-09-28 DIAGNOSIS — Z12.11 ENCOUNTER FOR COLORECTAL CANCER SCREENING: ICD-10-CM

## 2023-09-28 DIAGNOSIS — G62.9 NEUROPATHY: ICD-10-CM

## 2023-09-28 DIAGNOSIS — R06.02 SHORTNESS OF BREATH: ICD-10-CM

## 2023-09-28 DIAGNOSIS — Z00.00 ENCOUNTER FOR ANNUAL PHYSICAL EXAM: Primary | ICD-10-CM

## 2023-09-28 DIAGNOSIS — Z12.31 SCREENING MAMMOGRAM FOR BREAST CANCER: ICD-10-CM

## 2023-09-28 LAB
ALBUMIN SERPL BCP-MCNC: 4 G/DL (ref 3.5–5.2)
ALP SERPL-CCNC: 116 U/L (ref 55–135)
ALT SERPL W/O P-5'-P-CCNC: 27 U/L (ref 10–44)
ANION GAP SERPL CALC-SCNC: 8 MMOL/L (ref 8–16)
AST SERPL-CCNC: 27 U/L (ref 10–40)
BASOPHILS # BLD AUTO: 0.04 K/UL (ref 0–0.2)
BASOPHILS NFR BLD: 0.5 % (ref 0–1.9)
BILIRUB SERPL-MCNC: 0.3 MG/DL (ref 0.1–1)
BUN SERPL-MCNC: 10 MG/DL (ref 6–20)
CALCIUM SERPL-MCNC: 9.9 MG/DL (ref 8.7–10.5)
CHLORIDE SERPL-SCNC: 104 MMOL/L (ref 95–110)
CHOLEST SERPL-MCNC: 138 MG/DL (ref 120–199)
CHOLEST/HDLC SERPL: 3.1 {RATIO} (ref 2–5)
CO2 SERPL-SCNC: 26 MMOL/L (ref 23–29)
CREAT SERPL-MCNC: 0.7 MG/DL (ref 0.5–1.4)
DIFFERENTIAL METHOD: ABNORMAL
EOSINOPHIL # BLD AUTO: 0.1 K/UL (ref 0–0.5)
EOSINOPHIL NFR BLD: 0.9 % (ref 0–8)
ERYTHROCYTE [DISTWIDTH] IN BLOOD BY AUTOMATED COUNT: 12.7 % (ref 11.5–14.5)
EST. GFR  (NO RACE VARIABLE): >60 ML/MIN/1.73 M^2
ESTIMATED AVG GLUCOSE: 126 MG/DL (ref 68–131)
FOLATE SERPL-MCNC: 7.1 NG/ML (ref 4–24)
GLUCOSE SERPL-MCNC: 111 MG/DL (ref 70–110)
HBA1C MFR BLD: 6 % (ref 4–5.6)
HCT VFR BLD AUTO: 36.8 % (ref 37–48.5)
HDLC SERPL-MCNC: 45 MG/DL (ref 40–75)
HDLC SERPL: 32.6 % (ref 20–50)
HGB BLD-MCNC: 12.9 G/DL (ref 12–16)
IMM GRANULOCYTES # BLD AUTO: 0.02 K/UL (ref 0–0.04)
IMM GRANULOCYTES NFR BLD AUTO: 0.3 % (ref 0–0.5)
LDLC SERPL CALC-MCNC: 76.6 MG/DL (ref 63–159)
LYMPHOCYTES # BLD AUTO: 2.6 K/UL (ref 1–4.8)
LYMPHOCYTES NFR BLD: 33.7 % (ref 18–48)
MCH RBC QN AUTO: 28.9 PG (ref 27–31)
MCHC RBC AUTO-ENTMCNC: 35.1 G/DL (ref 32–36)
MCV RBC AUTO: 83 FL (ref 82–98)
MONOCYTES # BLD AUTO: 0.5 K/UL (ref 0.3–1)
MONOCYTES NFR BLD: 6.8 % (ref 4–15)
NEUTROPHILS # BLD AUTO: 4.4 K/UL (ref 1.8–7.7)
NEUTROPHILS NFR BLD: 57.8 % (ref 38–73)
NONHDLC SERPL-MCNC: 93 MG/DL
NRBC BLD-RTO: 0 /100 WBC
PLATELET # BLD AUTO: 253 K/UL (ref 150–450)
PMV BLD AUTO: 12.3 FL (ref 9.2–12.9)
POTASSIUM SERPL-SCNC: 2.9 MMOL/L (ref 3.5–5.1)
PROT SERPL-MCNC: 7.6 G/DL (ref 6–8.4)
RBC # BLD AUTO: 4.46 M/UL (ref 4–5.4)
SODIUM SERPL-SCNC: 138 MMOL/L (ref 136–145)
T4 FREE SERPL-MCNC: 1.16 NG/DL (ref 0.71–1.51)
TRIGL SERPL-MCNC: 82 MG/DL (ref 30–150)
TSH SERPL DL<=0.005 MIU/L-ACNC: 0.35 UIU/ML (ref 0.4–4)
VIT B12 SERPL-MCNC: 502 PG/ML (ref 210–950)
WBC # BLD AUTO: 7.68 K/UL (ref 3.9–12.7)

## 2023-09-28 PROCEDURE — 83036 HEMOGLOBIN GLYCOSYLATED A1C: CPT | Performed by: INTERNAL MEDICINE

## 2023-09-28 PROCEDURE — 99999 PR PBB SHADOW E&M-EST. PATIENT-LVL III: CPT | Mod: PBBFAC,,, | Performed by: INTERNAL MEDICINE

## 2023-09-28 PROCEDURE — 99213 OFFICE O/P EST LOW 20 MIN: CPT | Mod: PBBFAC | Performed by: INTERNAL MEDICINE

## 2023-09-28 PROCEDURE — 3008F PR BODY MASS INDEX (BMI) DOCUMENTED: ICD-10-PCS | Mod: CPTII,,, | Performed by: INTERNAL MEDICINE

## 2023-09-28 PROCEDURE — 82746 ASSAY OF FOLIC ACID SERUM: CPT | Performed by: INTERNAL MEDICINE

## 2023-09-28 PROCEDURE — 3075F PR MOST RECENT SYSTOLIC BLOOD PRESS GE 130-139MM HG: ICD-10-PCS | Mod: CPTII,,, | Performed by: INTERNAL MEDICINE

## 2023-09-28 PROCEDURE — 99999 PR PBB SHADOW E&M-EST. PATIENT-LVL III: ICD-10-PCS | Mod: PBBFAC,,, | Performed by: INTERNAL MEDICINE

## 2023-09-28 PROCEDURE — 80061 LIPID PANEL: CPT | Performed by: INTERNAL MEDICINE

## 2023-09-28 PROCEDURE — 82607 VITAMIN B-12: CPT | Performed by: INTERNAL MEDICINE

## 2023-09-28 PROCEDURE — 80053 COMPREHEN METABOLIC PANEL: CPT | Performed by: INTERNAL MEDICINE

## 2023-09-28 PROCEDURE — 3079F PR MOST RECENT DIASTOLIC BLOOD PRESSURE 80-89 MM HG: ICD-10-PCS | Mod: CPTII,,, | Performed by: INTERNAL MEDICINE

## 2023-09-28 PROCEDURE — 84443 ASSAY THYROID STIM HORMONE: CPT | Performed by: INTERNAL MEDICINE

## 2023-09-28 PROCEDURE — 84439 ASSAY OF FREE THYROXINE: CPT | Performed by: INTERNAL MEDICINE

## 2023-09-28 PROCEDURE — 3008F BODY MASS INDEX DOCD: CPT | Mod: CPTII,,, | Performed by: INTERNAL MEDICINE

## 2023-09-28 PROCEDURE — 3079F DIAST BP 80-89 MM HG: CPT | Mod: CPTII,,, | Performed by: INTERNAL MEDICINE

## 2023-09-28 PROCEDURE — 85025 COMPLETE CBC W/AUTO DIFF WBC: CPT | Performed by: INTERNAL MEDICINE

## 2023-09-28 PROCEDURE — 3075F SYST BP GE 130 - 139MM HG: CPT | Mod: CPTII,,, | Performed by: INTERNAL MEDICINE

## 2023-09-28 PROCEDURE — 99396 PR PREVENTIVE VISIT,EST,40-64: ICD-10-PCS | Mod: S$PBB,,, | Performed by: INTERNAL MEDICINE

## 2023-09-28 PROCEDURE — 36415 COLL VENOUS BLD VENIPUNCTURE: CPT | Performed by: INTERNAL MEDICINE

## 2023-09-28 PROCEDURE — 99396 PREV VISIT EST AGE 40-64: CPT | Mod: S$PBB,,, | Performed by: INTERNAL MEDICINE

## 2023-09-28 NOTE — PROGRESS NOTES
Subjective:       Patient ID: Adri Liriano is a 58 y.o. female.    Chief Complaint: Annual     HPI    She is feeling well today. NO acute complaints.       She was seen in the ED in August for Bronchitis. CXR at that time showed cardiomegaly with some venous congestion. She states she does get tired with mild shortness of breath on exertion.     Has also been having some mild neuropathy in bilateral feet.     Had some mildly decreased potassium on last labs. Has started to eat more bananas and grapefruit.      GERD/Dysphagia: doing well now. Had EGD in [place] in 2015 with dilatation. Had been doing well but lately has been having issues swallowing again.     HTN: well controlled on chlorthalidone     HLD: well controlled on stain     Epilepsy:  recently established with neurology here. She has now weaned off Dilantin and is now only on keppra BID    Current smoker: 9 pack year hx. Smokes 3-4 cigarettes per day      Health Maintenance:  Colon Cancer Screening: order today   Mammogram: order today   HIV: negative 2022    Hep C: negative 2022   Lipids: normal in 10/2022   Pap Smear: PAP normal in 11/2022 due again in 2025   Vaccines: flu shot today          Review of Systems   Constitutional:  Negative for activity change, appetite change and chills.   HENT:  Negative for ear pain, sinus pressure/congestion and sneezing.    Respiratory:  Negative for cough and shortness of breath.    Cardiovascular:  Negative for chest pain, palpitations and leg swelling.   Gastrointestinal:  Negative for abdominal distention, abdominal pain, constipation, diarrhea, nausea and vomiting.   Genitourinary:  Negative for dysuria and hematuria.   Musculoskeletal:  Negative for arthralgias, back pain and myalgias.   Neurological:  Negative for dizziness and headaches.   Psychiatric/Behavioral:  Negative for agitation. The patient is not nervous/anxious.            Past Medical History:   Diagnosis Date    Arthritis     GERD  (gastroesophageal reflux disease)     HTN (hypertension), benign     Mixed hyperlipidemia     Seizures      Past Surgical History:   Procedure Laterality Date    UPPER GASTROINTESTINAL ENDOSCOPY  2019      Patient Active Problem List   Diagnosis    Seizures    GERD (gastroesophageal reflux disease)    Mixed hyperlipidemia    HTN (hypertension), benign        Objective:      Physical Exam  Constitutional:       Appearance: Normal appearance.   HENT:      Head: Normocephalic.      Right Ear: Tympanic membrane normal.      Left Ear: Tympanic membrane normal.      Nose: Nose normal.   Cardiovascular:      Rate and Rhythm: Normal rate and regular rhythm.      Pulses: Normal pulses.      Heart sounds: Normal heart sounds.   Pulmonary:      Effort: Pulmonary effort is normal.      Breath sounds: Normal breath sounds.   Abdominal:      General: Abdomen is flat. Bowel sounds are normal.      Palpations: Abdomen is soft.   Musculoskeletal:         General: Normal range of motion.      Cervical back: Normal range of motion and neck supple.   Skin:     General: Skin is warm and dry.   Neurological:      General: No focal deficit present.      Mental Status: She is alert and oriented to person, place, and time.   Psychiatric:         Mood and Affect: Mood normal.         Assessment:       Problem List Items Addressed This Visit          Cardiac/Vascular    HTN (hypertension), benign    Relevant Orders    Comprehensive Metabolic Panel    CBC Auto Differential     Other Visit Diagnoses       Encounter for annual physical exam    -  Primary    Cardiomegaly        Relevant Orders    Echo    Lipid Panel    Shortness of breath        Relevant Orders    Echo    Neuropathy        Relevant Orders    Hemoglobin A1C    TSH    VITAMIN B12    FOLATE    Screening mammogram for breast cancer        Relevant Orders    Mammo Digital Screening Bilat    Encounter for colorectal cancer screening        Relevant Orders    Ambulatory referral/consult  to Endo Procedure             Plan:         Adri was seen today for follow-up.    Diagnoses and all orders for this visit:    Encounter for annual physical exam  Colon Cancer Screening: order today   Mammogram: order today   HIV: negative 2022    Hep C: negative 2022   Lipids: normal in 10/2022   Pap Smear: PAP normal in 11/2022 due again in 2025   Vaccines: flu shot today   Annual wellness exam completed.     All medications, histories, and concerns reviewed, reconciled, and addressed.     Appropriate Screenings per pt's sex and age have been reviewed and discussed with pt.       Cardiomegaly  Shortness of breath  -     Echo; Future  -     Lipid Panel; Future  Follow up CXR findings with 2D echo given her shortness of breath.     Neuropathy  -     Hemoglobin A1C; Future  -     TSH; Future  -     VITAMIN B12; Future  -     FOLATE; Future    HTN (hypertension), benign  Well controlled on chlorthalidone. Repeat CMP, if K is still low may need to switch medications     Screening mammogram for breast cancer  -     Mammo Digital Screening Bilat; Future    Encounter for colorectal cancer screening  -     Ambulatory referral/consult to Endo Procedure ; Future                 Sandrita Hussein MD   Internal Medicine   Primary Care

## 2023-10-26 ENCOUNTER — HOSPITAL ENCOUNTER (OUTPATIENT)
Dept: RADIOLOGY | Facility: HOSPITAL | Age: 58
Discharge: HOME | End: 2023-10-26
Attending: INTERNAL MEDICINE
Payer: MEDICAID

## 2023-10-26 DIAGNOSIS — Z12.31 SCREENING MAMMOGRAM FOR BREAST CANCER: ICD-10-CM

## 2023-10-26 PROCEDURE — 77063 BREAST TOMOSYNTHESIS BI: CPT | Mod: 26,,, | Performed by: RADIOLOGY

## 2023-10-26 PROCEDURE — 77067 SCR MAMMO BI INCL CAD: CPT | Mod: TC

## 2023-10-26 PROCEDURE — 77067 MAMMO DIGITAL SCREENING BILAT WITH TOMO: ICD-10-PCS | Mod: 26,,, | Performed by: RADIOLOGY

## 2023-10-26 PROCEDURE — 77063 MAMMO DIGITAL SCREENING BILAT WITH TOMO: ICD-10-PCS | Mod: 26,,, | Performed by: RADIOLOGY

## 2023-10-26 PROCEDURE — 77067 SCR MAMMO BI INCL CAD: CPT | Mod: 26,,, | Performed by: RADIOLOGY

## 2023-11-04 DIAGNOSIS — R79.89 ELEVATED TSH: Primary | ICD-10-CM

## 2023-11-04 DIAGNOSIS — E87.6 HYPOKALEMIA: ICD-10-CM

## 2023-11-06 ENCOUNTER — TELEPHONE (OUTPATIENT)
Dept: INTERNAL MEDICINE | Facility: CLINIC | Age: 58
End: 2023-11-06

## 2023-11-06 NOTE — TELEPHONE ENCOUNTER
----- Message from Sandrita Hussein MD sent at 11/4/2023 12:36 PM CDT -----  Please help her schedule these repeat labs for sometime next week. Thanks!

## 2023-11-09 ENCOUNTER — LAB VISIT (OUTPATIENT)
Dept: LAB | Facility: HOSPITAL | Age: 58
End: 2023-11-09

## 2023-11-09 DIAGNOSIS — E87.6 HYPOKALEMIA: ICD-10-CM

## 2023-11-09 DIAGNOSIS — R79.89 ELEVATED TSH: ICD-10-CM

## 2023-11-09 LAB
ALBUMIN SERPL BCP-MCNC: 4.4 G/DL (ref 3.5–5.2)
ANION GAP SERPL CALC-SCNC: 14 MMOL/L (ref 8–16)
BUN SERPL-MCNC: 15 MG/DL (ref 6–20)
CALCIUM SERPL-MCNC: 10.7 MG/DL (ref 8.7–10.5)
CHLORIDE SERPL-SCNC: 103 MMOL/L (ref 95–110)
CO2 SERPL-SCNC: 25 MMOL/L (ref 23–29)
CREAT SERPL-MCNC: 0.8 MG/DL (ref 0.5–1.4)
EST. GFR  (NO RACE VARIABLE): >60 ML/MIN/1.73 M^2
GLUCOSE SERPL-MCNC: 117 MG/DL (ref 70–110)
PHOSPHATE SERPL-MCNC: 2.7 MG/DL (ref 2.7–4.5)
POTASSIUM SERPL-SCNC: 3.3 MMOL/L (ref 3.5–5.1)
SODIUM SERPL-SCNC: 142 MMOL/L (ref 136–145)
T4 FREE SERPL-MCNC: 1.22 NG/DL (ref 0.71–1.51)
TSH SERPL DL<=0.005 MIU/L-ACNC: 0.28 UIU/ML (ref 0.4–4)

## 2023-11-09 PROCEDURE — 84439 ASSAY OF FREE THYROXINE: CPT | Performed by: INTERNAL MEDICINE

## 2023-11-09 PROCEDURE — 36415 COLL VENOUS BLD VENIPUNCTURE: CPT | Performed by: INTERNAL MEDICINE

## 2023-11-09 PROCEDURE — 84443 ASSAY THYROID STIM HORMONE: CPT | Performed by: INTERNAL MEDICINE

## 2023-11-09 PROCEDURE — 80069 RENAL FUNCTION PANEL: CPT | Performed by: INTERNAL MEDICINE

## 2023-11-10 ENCOUNTER — OFFICE VISIT (OUTPATIENT)
Dept: NEUROLOGY | Facility: CLINIC | Age: 58
End: 2023-11-10

## 2023-11-10 VITALS
HEIGHT: 60 IN | BODY MASS INDEX: 39.27 KG/M2 | DIASTOLIC BLOOD PRESSURE: 79 MMHG | HEART RATE: 76 BPM | SYSTOLIC BLOOD PRESSURE: 116 MMHG | WEIGHT: 200 LBS

## 2023-11-10 DIAGNOSIS — G40.909 SEIZURE DISORDER: ICD-10-CM

## 2023-11-10 DIAGNOSIS — G40.909 NONINTRACTABLE EPILEPSY WITHOUT STATUS EPILEPTICUS, UNSPECIFIED EPILEPSY TYPE: Primary | ICD-10-CM

## 2023-11-10 PROCEDURE — 99213 OFFICE O/P EST LOW 20 MIN: CPT | Mod: PBBFAC | Performed by: PSYCHIATRY & NEUROLOGY

## 2023-11-10 PROCEDURE — 99999 PR PBB SHADOW E&M-EST. PATIENT-LVL III: CPT | Mod: PBBFAC,,, | Performed by: PSYCHIATRY & NEUROLOGY

## 2023-11-10 PROCEDURE — 99213 OFFICE O/P EST LOW 20 MIN: CPT | Mod: S$PBB,,, | Performed by: PSYCHIATRY & NEUROLOGY

## 2023-11-10 PROCEDURE — 99999 PR PBB SHADOW E&M-EST. PATIENT-LVL III: ICD-10-PCS | Mod: PBBFAC,,, | Performed by: PSYCHIATRY & NEUROLOGY

## 2023-11-10 PROCEDURE — 99213 PR OFFICE/OUTPT VISIT, EST, LEVL III, 20-29 MIN: ICD-10-PCS | Mod: S$PBB,,, | Performed by: PSYCHIATRY & NEUROLOGY

## 2023-11-10 RX ORDER — LEVETIRACETAM 1000 MG/1
1000 TABLET ORAL 2 TIMES DAILY
Qty: 180 TABLET | Refills: 3 | Status: SHIPPED | OUTPATIENT
Start: 2023-11-10 | End: 2024-11-09

## 2023-11-10 NOTE — PROGRESS NOTES
[facility] - NEUROLOGY 7TH [address] [facility], [place]    Date: 11/10/23  Patient Name: Adri Liriano   MRN: [id number]   PCP: Sandrita Hussein  Referring Provider: No ref. provider found    Chief Complaint:  follow up for epilepsy  Subjective:     11/10/23:  Patient reports that she is been doing well since previous encounter.  No breakthrough seizure activity.  As previously discussed and planned, the patient has transitioned to Keppra 1 g b.i.d. and discontinued use of Dilantin.  Denies side effects with the medication including sleepiness or altered mood.  Taking medication consistently.  No new neurological complaints otherwise.  Denies focal or lateralizing sensory or motor deficits at this time.     ===============================================  07/21/23 HPI:   Ms. Adri Liriano is a 58 y.o. right-handed female presenting to [facility] care for history of epilepsy.      Age of seizure onset:  16   Most recent seizure:  4-5 years ago  Aura:  None    Seizure semiology:  1. No aura or warning, sudden onset loss of consciousness with stiffening and jerking of the limbs lasting for minutes.  Has sometimes involved bladder incontinence.  No tongue biting.    2. Staring episodes with loss of awareness    Current medication:  Phenytoin extended release 100 mg t.i.d.  Medications failed:  None    Most recent EEG:  Unsure, none in our system  Most recent brain imaging:  MRI brain 2012, no images available for review but report notes cerebellar atrophy    Denies side effects with current medication.  Specifically denied any recent falls, problems with ataxia, tremor, vision changes, problems swallowing, changes in speech, changes in gumline, palpitations or chest pain.  Has had CBC and CMP completed within the last year with no relevant findings.  No recent Dilantin level.  ===============================================    CURRENT MEDS:  Current Outpatient Medications   Medication  Sig Dispense Refill    levETIRAcetam (KEPPRA) 1000 MG tablet Take 1 tablet (1,000 mg total) by mouth 2 (two) times daily. 180 tablet 3    atorvastatin (LIPITOR) 40 MG tablet Take 1 tablet (40 mg total) by mouth once daily. 90 tablet 3    benzonatate (TESSALON) 100 MG capsule Take 1 capsule (100 mg total) by mouth 3 (three) times daily as needed for Cough. 20 capsule 0    chlorthalidone (HYGROTEN) 25 MG Tab Take 1 tablet (25 mg total) by mouth once daily. 90 tablet 3    esomeprazole (NEXIUM) 40 MG capsule Take 1 capsule (40 mg total) by mouth before breakfast. 90 capsule 3     No current facility-administered medications for this visit.     ALLERGIES:  Review of patient's allergies indicates:  No Known Allergies       Objective:     Vitals:    11/10/23 1357   BP: 116/79   BP Location: Left arm   Patient Position: Sitting   BP Method: Large (Automatic)   Pulse: 76   Weight: 90.7 kg (200 lb)   Height: 5' (1.524 m)       General: female in NAD, alert and awake, Aox3, well groomed. ?       Neurological Examination.    Mental status: AA&O x3; Affect/mood is euthymic/congruent; no aphasia      Cranial Nerves: II-XII grossly intact     Muscle Function:  Full and symmetric use against gravity in bilateral upper and lower extremities     Gait: adequate casual gait with stride length and arm swing WNL.     Assessment:   Adri Liriano is a 58 y.o. female presenting for routine follow-up regarding epilepsy.  No breakthrough seizure activity since previous encounter.  Patient has tolerated the transition from Dilantin to Keppra quite well with no noted side effects.  She continues on 1 g b.i.d. at this time.  No current indication for updated neurological imaging or EEG.  We will plan for routine follow-up in 1 year or sooner as needed.      Plan:     1. Nonintractable epilepsy without status epilepticus, unspecified epilepsy type  Ambulatory consult to Neurology      2. Seizure disorder  levETIRAcetam (KEPPRA) 1000 MG tablet         - continue: Keppra 1000 mg twice daily   - Seizure safety discussed extensively including avoidance of risky situations, large bodies of water, swimming alone, baths. We also discussed avoiding driving or operating other heavy machinery for 6 months after a breakthrough event in the [facility].   - Advised the patient to avoid seizure provoking behaviors including excessive alcohol consumption, sleep deprivation, and medication noncompliance.   - continue caution for least the next 3 months given recent medication transition.  - reach out to Neurology immediately if breakthrough seizure activity occurs where increasing Keppra may be considered.  - given upcoming changes in clinic coverage, the patient was counseled that she would need to establish care with another neurologist for future refills/follow-ups.  Verbally expressed understanding.  Referral placed to epilepsy providers in our system.    I spent a total of 20 minutes on the day of the visit. This includes face to face time and non-face to face time preparing to see the patient (eg, review of tests), obtaining and/or reviewing separately obtained history, documenting clinical information in the electronic or other health record, independently interpreting results and communicating results to the patient/family/caregiver, or care coordinator.    A dictation device was used to produce this document. Use of such devices sometimes results in grammatical errors or replacement of words that sound similarly.    Norberto Pepe, DO

## 2023-11-20 ENCOUNTER — CLINICAL SUPPORT (OUTPATIENT)
Dept: ENDOSCOPY | Facility: HOSPITAL | Age: 58
End: 2023-11-20

## 2023-11-20 DIAGNOSIS — Z12.12 ENCOUNTER FOR COLORECTAL CANCER SCREENING: ICD-10-CM

## 2023-11-20 DIAGNOSIS — Z12.11 ENCOUNTER FOR COLORECTAL CANCER SCREENING: ICD-10-CM

## 2023-11-20 DIAGNOSIS — Z12.11 SPECIAL SCREENING FOR MALIGNANT NEOPLASMS, COLON: Primary | ICD-10-CM

## 2023-11-20 NOTE — PLAN OF CARE
Pt. Has no health insurance presently. She will call back to schedule in January when her Health Insurance becomes active. New refendo set for pt.

## 2023-12-09 ENCOUNTER — PATIENT MESSAGE (OUTPATIENT)
Dept: ADMINISTRATIVE | Facility: OTHER | Age: 58
End: 2023-12-09

## 2023-12-12 ENCOUNTER — TELEPHONE (OUTPATIENT)
Dept: INTERNAL MEDICINE | Facility: CLINIC | Age: 58
End: 2023-12-12

## 2023-12-12 DIAGNOSIS — R79.89 ELEVATED TSH: Primary | ICD-10-CM

## 2023-12-12 NOTE — TELEPHONE ENCOUNTER
Called patient and give them endocrine phone number, informed that they will contact her and book an appt via phone but incase of delay, provided her their phone number

## 2023-12-12 NOTE — TELEPHONE ENCOUNTER
----- Message from Sandrita Hussein MD sent at 12/12/2023 12:13 PM CST -----  Please help her schedule endocrine appt. I placed referral

## 2024-01-02 ENCOUNTER — TELEPHONE (OUTPATIENT)
Dept: INTERNAL MEDICINE | Facility: CLINIC | Age: 59
End: 2024-01-02

## 2024-01-02 NOTE — TELEPHONE ENCOUNTER
----- Message from Cali Yates sent at 1/2/2024  9:56 AM CST -----  Contact: [phone]@[email or web address]  Good morning patient would like a call back to discuss her insurance.Patient needs to know what insurances does the doc accept so she can continue to the doc . Please give patient a call back        [phone]

## 2024-01-16 DIAGNOSIS — E78.2 MIXED HYPERLIPIDEMIA: ICD-10-CM

## 2024-01-16 NOTE — TELEPHONE ENCOUNTER
No care due was identified.  [facility] Embedded Care Due Messages. Reference number: [id number].   1/16/2024 12:42:23 PM CST

## 2024-01-17 RX ORDER — ATORVASTATIN CALCIUM 40 MG/1
40 TABLET, FILM COATED ORAL DAILY
Qty: 90 TABLET | Refills: 2 | Status: SHIPPED | OUTPATIENT
Start: 2024-01-17

## 2024-01-17 NOTE — TELEPHONE ENCOUNTER
Refill Decision Note   Adri Liriano  is requesting a refill authorization.  Brief Assessment and Rationale for Refill:  Approve     Medication Therapy Plan:         Comments:     Note composed:11:02 AM 01/17/2024

## 2024-05-06 DIAGNOSIS — I10 HTN (HYPERTENSION), BENIGN: ICD-10-CM

## 2024-05-06 NOTE — TELEPHONE ENCOUNTER
No care due was identified.  [facility] Embedded Care Due Messages. Reference number: [id number].   5/06/2024 1:17:15 PM CDT

## 2024-05-06 NOTE — TELEPHONE ENCOUNTER
Refill Routing Note   Medication(s) are not appropriate for processing by [facility] for the following reason(s):        Required labs abnormal    [facility] action(s):  Defer               Appointments  past 12m or future 3m with PCP    Date Provider   Last Visit   9/28/2023 Sandrita Hussein MD   Next Visit   Visit date not found Sandrita Hussein MD   ED visits in past 90 days: 0        Note composed:6:29 PM 05/06/2024

## 2024-05-07 RX ORDER — CHLORTHALIDONE 25 MG/1
25 TABLET ORAL DAILY
Qty: 90 TABLET | Refills: 1 | Status: SHIPPED | OUTPATIENT
Start: 2024-05-07

## 2024-06-10 ENCOUNTER — PATIENT MESSAGE (OUTPATIENT)
Dept: INTERNAL MEDICINE | Facility: CLINIC | Age: 59
End: 2024-06-10

## 2024-09-24 ENCOUNTER — HOSPITAL ENCOUNTER (EMERGENCY)
Facility: HOSPITAL | Age: 59
Discharge: HOME | End: 2024-09-24
Attending: EMERGENCY MEDICINE

## 2024-09-24 VITALS
WEIGHT: 186 LBS | HEART RATE: 73 BPM | BODY MASS INDEX: 36.33 KG/M2 | TEMPERATURE: 98 F | DIASTOLIC BLOOD PRESSURE: 71 MMHG | SYSTOLIC BLOOD PRESSURE: 134 MMHG | RESPIRATION RATE: 18 BRPM | OXYGEN SATURATION: 99 %

## 2024-09-24 DIAGNOSIS — R25.2 FOOT CRAMPS: Primary | ICD-10-CM

## 2024-09-24 DIAGNOSIS — E87.6 HYPOKALEMIA: ICD-10-CM

## 2024-09-24 LAB
BUN SERPL-MCNC: 16 MG/DL (ref 6–30)
CHLORIDE SERPL-SCNC: 99 MMOL/L (ref 95–110)
CREAT SERPL-MCNC: 0.7 MG/DL (ref 0.5–1.4)
GLUCOSE SERPL-MCNC: 108 MG/DL (ref 70–110)
HCT VFR BLD CALC: 36 %PCV (ref 36–54)
POC IONIZED CALCIUM: 1.26 MMOL/L (ref 1.06–1.42)
POC TCO2 (MEASURED): 27 MMOL/L (ref 23–29)
POTASSIUM BLD-SCNC: 3.1 MMOL/L (ref 3.5–5.1)
SAMPLE: ABNORMAL
SODIUM BLD-SCNC: 141 MMOL/L (ref 136–145)

## 2024-09-24 PROCEDURE — 80047 BASIC METABLC PNL IONIZED CA: CPT

## 2024-09-24 PROCEDURE — 93005 ELECTROCARDIOGRAM TRACING: CPT

## 2024-09-24 PROCEDURE — 99283 EMERGENCY DEPT VISIT LOW MDM: CPT | Mod: 25

## 2024-09-24 PROCEDURE — 93010 ELECTROCARDIOGRAM REPORT: CPT | Mod: ,,, | Performed by: INTERNAL MEDICINE

## 2024-09-24 RX ORDER — POTASSIUM CHLORIDE 750 MG/1
10 TABLET, EXTENDED RELEASE ORAL DAILY
Qty: 7 TABLET | Refills: 0 | Status: SHIPPED | OUTPATIENT
Start: 2024-09-24 | End: 2024-10-02

## 2024-09-25 LAB
OHS QRS DURATION: 90 MS
OHS QTC CALCULATION: 449 MS

## 2024-09-25 NOTE — ED NOTES
Discharge home, states understanding to follow up as directed. Ambulates out of ED without difficulty. RX sent

## 2024-09-25 NOTE — DISCHARGE INSTRUCTIONS
Based on my exam there was no signs of infection.  At this time I can not explain the changes of your feet however I do recommend you follow up with a podiatrist for further evaluation.    Follow up with your primary care physician to determine if you should restart your potassium tablets.  I have prescribed you one-week of medications but would like you to follow up repeat blood work.

## 2024-09-25 NOTE — ED PROVIDER NOTES
Encounter Date: 9/24/2024       History     Chief Complaint   Patient presents with    Foot Swelling     Pt arrives c/o bilateral foot swelling since last week.     59-year-old female history of hyperlipidemia hypertension GERD and arthritis presenting to emergency department due to abnormal sensation of the right foot.  She was not say it is painful but states that sometimes her toes take together or do not move in the direction that she tries to make them move.  She was still able to ambulate at baseline.  She was not endorse swelling.  No skin changes.  No falls or trauma.      Review of patient's allergies indicates:  No Known Allergies  Past Medical History:   Diagnosis Date    Arthritis     GERD (gastroesophageal reflux disease)     HTN (hypertension), benign     Mixed hyperlipidemia     Seizures      Past Surgical History:   Procedure Laterality Date    UPPER GASTROINTESTINAL ENDOSCOPY  2019     Family History   Problem Relation Name Age of Onset    Hypertension Mother Michelle Jacques     Diabetes Mother Michelle Jacques     BRCA 1/2 Mother Michelle Jacques     No Known Problems Father       Social History     Tobacco Use    Smoking status: Every Day     Current packs/day: 0.25     Average packs/day: 0.3 packs/day for 36.0 years (9.0 ttl pk-yrs)     Types: Cigarettes    Smokeless tobacco: Never   Substance Use Topics    Alcohol use: Not Currently    Drug use: Never     Review of Systems  See HPI  Physical Exam     Initial Vitals [09/24/24 1902]   BP Pulse Resp Temp SpO2   134/71 73 18 97.6 °F (36.4 °C) 99 %      MAP       --         Physical Exam    Vitals reviewed.  Constitutional: She appears well-developed and well-nourished.   HENT:   Head: Normocephalic and atraumatic.   Eyes: Conjunctivae and EOM are normal.   Neck:   Normal range of motion.  Cardiovascular:  Normal rate.           Pulmonary/Chest: No respiratory distress.   Abdominal: Abdomen is soft. She exhibits no distension.   Musculoskeletal:          General: Normal range of motion.      Cervical back: Normal range of motion.      Comments: Right foot  No dorsum edema of the foot.  No skin changes, no erythema foot it was not hot to the touch.  No induration.  Intact range of motion no tenderness to palpation of the digits.  No obvious deformity on exam  DP 2+., sensation intact     Neurological: She is alert and oriented to person, place, and time.   Skin: Skin is warm and dry.   Psychiatric: She has a normal mood and affect. Thought content normal.         ED Course   Procedures  Labs Reviewed   ISTAT PROCEDURE - Abnormal       Result Value    POC Glucose 108      POC BUN 16      POC Creatinine 0.7      POC Sodium 141      POC Potassium 3.1 (*)     POC Chloride 99      POC TCO2 (MEASURED) 27      POC Ionized Calcium 1.26      POC Hematocrit 36      Sample LYNNE     ISTAT CHEM8          Imaging Results    None          Medications - No data to display  Medical Decision Making  Patient  is a 59 y.o.  female  presenting to the emergency department with complaints of  changes of right foot, nonpainful but weird movements.  No involuntary movements visualized on exam    Differential diagnosis includes but not limited to: no concern of gout or septic arthritis see physical exam findings. No falls or trauma was not suspect fractures or dislocations.    I-STAT performed rule out electrolyte changes such as hyper/hypokalemia which could cause muscle cramping    Patient still able to ambulate at baseline  I-STAT with mild hypokalemia, reviewed patient's chart appears she previously was on potassium tablets. I will provide her with one-week of p.o. potassium with recommendations to follow up with PCP    Disposition home   Patient reassessed, discussed dispo, given opportunity to ask additional questions prior to disposition               ED Course as of 09/24/24 2156   Tue Sep 24, 2024   2138 POC Potassium(!): 3.1 [CR]      ED Course User Index  [CR] Janel Rapp,  DANIA                           Clinical Impression:  Final diagnoses:  [R25.2] Foot cramps (Primary)  [E87.6] Hypokalemia          ED Disposition Condition    Discharge           ED Prescriptions       Medication Sig Dispense Start Date End Date Auth. Provider    potassium chloride (KLOR-CON) 10 MEQ TbSR Take 1 tablet (10 mEq total) by mouth once daily. for 7 days 7 tablet 9/24/2024 10/1/2024 Janel Rapp PA-C          Follow-up Information       Follow up With Specialties Details Why Contact Info    [facility] Schedule an appointment as soon as possible for a visit   [address] [address] [phone]    Sandrita Hussein MD Internal Medicine Schedule an appointment as soon as possible for a visit in 1 week  [address]  [place] [address]  [phone]               Janel Rapp PA-C  09/24/24 4949

## 2024-09-25 NOTE — ED TRIAGE NOTES
Adri Liriano, a 59 y.o. female presents to the ED w/ complaint of right toe pain. Pt states she gets cramps in her toes, especially when she is sleeping and stretches her toes out. Pt denies any trauma or injury to feet.     Triage note:  Chief Complaint   Patient presents with    Foot Swelling     Pt arrives c/o bilateral foot swelling since last week.     Review of patient's allergies indicates:  No Known Allergies  Past Medical History:   Diagnosis Date    Arthritis     GERD (gastroesophageal reflux disease)     HTN (hypertension), benign     Mixed hyperlipidemia     Seizures

## 2024-09-25 NOTE — ED NOTES
I-STAT Chem-8+ Results:   Value Reference Range   Sodium 141 136-145 mmol/L   Potassium  3.1 3.5-5.1 mmol/L   Chloride 99  mmol/L   Ionized Calcium 1.26 1.06-1.42 mmol/L   CO2 (measured) 27 23-29 mmol/L   Glucose 108  mg/dL   BUN 16 6-30 mg/dL   Creatinine 0.7 0.5-1.4 mg/dL   Hematocrit 36 36-54%

## 2024-10-17 DIAGNOSIS — G40.909 SEIZURE DISORDER: ICD-10-CM

## 2024-10-17 DIAGNOSIS — E78.2 MIXED HYPERLIPIDEMIA: ICD-10-CM

## 2024-10-17 RX ORDER — LEVETIRACETAM 1000 MG/1
1000 TABLET ORAL 2 TIMES DAILY
Qty: 180 TABLET | Refills: 3 | Status: CANCELLED | OUTPATIENT
Start: 2024-10-17 | End: 2025-10-17

## 2024-10-17 NOTE — TELEPHONE ENCOUNTER
Care Due:                  Date            Visit Type   Department     Provider  --------------------------------------------------------------------------------                                EP -                              PRIMARY      [facility]  Last Visit: 09-      CARE ([facility])   MEDICINE       Sandrita Hussein  Next Visit: None Scheduled  None         None Found                                                            Last  Test          Frequency    Reason                     Performed    Due Date  --------------------------------------------------------------------------------    Office Visit  15 months..  atorvastatin,              09- 12-                             chlorthalidone,                             esomeprazole.............    CMP.........  12 months..  atorvastatin.............  09- 09-    Lipid Panel.  12 months..  atorvastatin.............  09- 09-    [facility] Embedded Care Due Messages. Reference number: [id number].   10/17/2024 5:12:54 PM CDT

## 2024-10-18 RX ORDER — ATORVASTATIN CALCIUM 40 MG/1
40 TABLET, FILM COATED ORAL DAILY
Qty: 90 TABLET | Refills: 2 | Status: SHIPPED | OUTPATIENT
Start: 2024-10-18

## 2024-10-22 DIAGNOSIS — G40.909 SEIZURE DISORDER: ICD-10-CM

## 2024-10-28 RX ORDER — LEVETIRACETAM 1000 MG/1
1000 TABLET ORAL 2 TIMES DAILY
Qty: 180 TABLET | Refills: 3 | OUTPATIENT
Start: 2024-10-28 | End: 2025-10-28

## 2025-01-28 ENCOUNTER — TELEPHONE (OUTPATIENT)
Dept: INTERNAL MEDICINE | Facility: CLINIC | Age: 60
End: 2025-01-28
Payer: COMMERCIAL

## 2025-01-28 NOTE — TELEPHONE ENCOUNTER
----- Message from Debbie sent at 1/28/2025  1:32 PM CST -----  Contact: [phone] Pt  [email or web address]     Patient is calling for Medical Advice regarding:Appt     Patient wants a call back or thru [facility]:Call back     Comments:Pt would like a call back from nurse regarding getting appt with doctor.    Please advise patient replies from provider may take up to 48 hours.

## 2025-02-03 ENCOUNTER — LAB VISIT (OUTPATIENT)
Dept: LAB | Facility: HOSPITAL | Age: 60
End: 2025-02-03
Payer: COMMERCIAL

## 2025-02-03 ENCOUNTER — OFFICE VISIT (OUTPATIENT)
Dept: INTERNAL MEDICINE | Facility: CLINIC | Age: 60
End: 2025-02-03
Payer: COMMERCIAL

## 2025-02-03 VITALS
BODY MASS INDEX: 38.52 KG/M2 | SYSTOLIC BLOOD PRESSURE: 128 MMHG | RESPIRATION RATE: 5 BRPM | HEIGHT: 60 IN | WEIGHT: 196.19 LBS | HEART RATE: 78 BPM | OXYGEN SATURATION: 98 % | DIASTOLIC BLOOD PRESSURE: 71 MMHG

## 2025-02-03 DIAGNOSIS — I10 HTN (HYPERTENSION), BENIGN: ICD-10-CM

## 2025-02-03 DIAGNOSIS — R79.89 ELEVATED TSH: ICD-10-CM

## 2025-02-03 DIAGNOSIS — Z12.12 ENCOUNTER FOR COLORECTAL CANCER SCREENING: ICD-10-CM

## 2025-02-03 DIAGNOSIS — F43.21 GRIEF REACTION: ICD-10-CM

## 2025-02-03 DIAGNOSIS — Z12.31 SCREENING MAMMOGRAM FOR BREAST CANCER: ICD-10-CM

## 2025-02-03 DIAGNOSIS — E78.2 MIXED HYPERLIPIDEMIA: ICD-10-CM

## 2025-02-03 DIAGNOSIS — Z00.00 ENCOUNTER FOR ANNUAL PHYSICAL EXAM: Primary | ICD-10-CM

## 2025-02-03 DIAGNOSIS — Z12.11 ENCOUNTER FOR COLORECTAL CANCER SCREENING: ICD-10-CM

## 2025-02-03 LAB
ALBUMIN SERPL BCP-MCNC: 4.4 G/DL (ref 3.5–5.2)
ALP SERPL-CCNC: 89 U/L (ref 40–150)
ALT SERPL W/O P-5'-P-CCNC: 37 U/L (ref 10–44)
ANION GAP SERPL CALC-SCNC: 13 MMOL/L (ref 8–16)
AST SERPL-CCNC: 31 U/L (ref 10–40)
BASOPHILS # BLD AUTO: 0.04 K/UL (ref 0–0.2)
BASOPHILS NFR BLD: 0.7 % (ref 0–1.9)
BILIRUB SERPL-MCNC: 0.4 MG/DL (ref 0.1–1)
BUN SERPL-MCNC: 17 MG/DL (ref 6–20)
CALCIUM SERPL-MCNC: 10.5 MG/DL (ref 8.7–10.5)
CHLORIDE SERPL-SCNC: 105 MMOL/L (ref 95–110)
CHOLEST SERPL-MCNC: 145 MG/DL (ref 120–199)
CHOLEST/HDLC SERPL: 2.6 {RATIO} (ref 2–5)
CO2 SERPL-SCNC: 23 MMOL/L (ref 23–29)
CREAT SERPL-MCNC: 0.9 MG/DL (ref 0.5–1.4)
DIFFERENTIAL METHOD BLD: NORMAL
EOSINOPHIL # BLD AUTO: 0.1 K/UL (ref 0–0.5)
EOSINOPHIL NFR BLD: 0.9 % (ref 0–8)
ERYTHROCYTE [DISTWIDTH] IN BLOOD BY AUTOMATED COUNT: 12.1 % (ref 11.5–14.5)
EST. GFR  (NO RACE VARIABLE): >60 ML/MIN/1.73 M^2
ESTIMATED AVG GLUCOSE: 120 MG/DL (ref 68–131)
GLUCOSE SERPL-MCNC: 95 MG/DL (ref 70–110)
HBA1C MFR BLD: 5.8 % (ref 4–5.6)
HCT VFR BLD AUTO: 41.1 % (ref 37–48.5)
HDLC SERPL-MCNC: 56 MG/DL (ref 40–75)
HDLC SERPL: 38.6 % (ref 20–50)
HGB BLD-MCNC: 13.3 G/DL (ref 12–16)
IMM GRANULOCYTES # BLD AUTO: 0.01 K/UL (ref 0–0.04)
IMM GRANULOCYTES NFR BLD AUTO: 0.2 % (ref 0–0.5)
LDLC SERPL CALC-MCNC: 74.6 MG/DL (ref 63–159)
LYMPHOCYTES # BLD AUTO: 2.3 K/UL (ref 1–4.8)
LYMPHOCYTES NFR BLD: 40.8 % (ref 18–48)
MCH RBC QN AUTO: 29.4 PG (ref 27–31)
MCHC RBC AUTO-ENTMCNC: 32.4 G/DL (ref 32–36)
MCV RBC AUTO: 91 FL (ref 82–98)
MONOCYTES # BLD AUTO: 0.3 K/UL (ref 0.3–1)
MONOCYTES NFR BLD: 5.6 % (ref 4–15)
NEUTROPHILS # BLD AUTO: 2.9 K/UL (ref 1.8–7.7)
NEUTROPHILS NFR BLD: 51.8 % (ref 38–73)
NONHDLC SERPL-MCNC: 89 MG/DL
NRBC BLD-RTO: 0 /100 WBC
PLATELET # BLD AUTO: 235 K/UL (ref 150–450)
PMV BLD AUTO: 12.4 FL (ref 9.2–12.9)
POTASSIUM SERPL-SCNC: 3.8 MMOL/L (ref 3.5–5.1)
PROT SERPL-MCNC: 8.7 G/DL (ref 6–8.4)
RBC # BLD AUTO: 4.53 M/UL (ref 4–5.4)
SODIUM SERPL-SCNC: 141 MMOL/L (ref 136–145)
TRIGL SERPL-MCNC: 72 MG/DL (ref 30–150)
TSH SERPL DL<=0.005 MIU/L-ACNC: 0.44 UIU/ML (ref 0.4–4)
WBC # BLD AUTO: 5.57 K/UL (ref 3.9–12.7)

## 2025-02-03 PROCEDURE — 85025 COMPLETE CBC W/AUTO DIFF WBC: CPT | Performed by: INTERNAL MEDICINE

## 2025-02-03 PROCEDURE — 99999 PR PBB SHADOW E&M-EST. PATIENT-LVL V: CPT | Mod: PBBFAC,,, | Performed by: INTERNAL MEDICINE

## 2025-02-03 PROCEDURE — 3074F SYST BP LT 130 MM HG: CPT | Mod: CPTII,S$GLB,, | Performed by: INTERNAL MEDICINE

## 2025-02-03 PROCEDURE — 83036 HEMOGLOBIN GLYCOSYLATED A1C: CPT | Performed by: INTERNAL MEDICINE

## 2025-02-03 PROCEDURE — 3078F DIAST BP <80 MM HG: CPT | Mod: CPTII,S$GLB,, | Performed by: INTERNAL MEDICINE

## 2025-02-03 PROCEDURE — 84443 ASSAY THYROID STIM HORMONE: CPT | Performed by: INTERNAL MEDICINE

## 2025-02-03 PROCEDURE — 80061 LIPID PANEL: CPT | Performed by: INTERNAL MEDICINE

## 2025-02-03 PROCEDURE — 99396 PREV VISIT EST AGE 40-64: CPT | Mod: 25,S$GLB,, | Performed by: INTERNAL MEDICINE

## 2025-02-03 PROCEDURE — 36415 COLL VENOUS BLD VENIPUNCTURE: CPT | Performed by: INTERNAL MEDICINE

## 2025-02-03 PROCEDURE — 3008F BODY MASS INDEX DOCD: CPT | Mod: CPTII,S$GLB,, | Performed by: INTERNAL MEDICINE

## 2025-02-03 PROCEDURE — 3044F HG A1C LEVEL LT 7.0%: CPT | Mod: CPTII,S$GLB,, | Performed by: INTERNAL MEDICINE

## 2025-02-03 PROCEDURE — 80053 COMPREHEN METABOLIC PANEL: CPT | Performed by: INTERNAL MEDICINE

## 2025-02-03 RX ORDER — ESCITALOPRAM OXALATE 10 MG/1
10 TABLET ORAL DAILY
Qty: 90 TABLET | Refills: 3 | Status: SHIPPED | OUTPATIENT
Start: 2025-02-03 | End: 2026-02-03

## 2025-02-03 NOTE — PROGRESS NOTES
Subjective:       Patient ID: Adri Liriano is a 60 y.o. female.    Chief Complaint: Annual Exam    HPI    Has been going through a hard time lately with her  being terminally ill.  Has been feeling depressed and overwhelmed by life stressors.     GERD/Dysphagia: doing well now. Had EGD in [place] in 2015 with dilatation. Had been doing well but lately has been having issues swallowing again.      HTN: well controlled on chlorthalidone      HLD: well controlled on stain      Epilepsy:  recently established with neurology here. She has now weaned off Dilantin and is now only on keppra BID     Current smoker: 9 pack year hx. Smokes 3-4 cigarettes per day      Health Maintenance:  Colon Cancer Screening:  Scheduled  Mammogram:  Ordered today  HIV: negative 2022    Hep C: negative 2022   Lipids: normal in 10/2022   Pap Smear: PAP normal in 11/2022 due again in 2025   Vaccines:  Up-to-date           Review of Systems   Constitutional:  Negative for activity change, appetite change and chills.   HENT:  Negative for ear pain, sinus pressure/congestion and sneezing.    Respiratory:  Negative for cough and shortness of breath.    Cardiovascular:  Negative for chest pain, palpitations and leg swelling.   Gastrointestinal:  Positive for constipation. Negative for abdominal distention, abdominal pain, diarrhea, nausea and vomiting.   Genitourinary:  Negative for dysuria and hematuria.   Musculoskeletal:  Negative for arthralgias, back pain and myalgias.   Neurological:  Negative for dizziness and headaches.   Psychiatric/Behavioral:  Negative for agitation. The patient is not nervous/anxious.            Past Medical History:   Diagnosis Date    Arthritis     GERD (gastroesophageal reflux disease)     HTN (hypertension), benign     Mixed hyperlipidemia     Seizures      Past Surgical History:   Procedure Laterality Date    UPPER GASTROINTESTINAL ENDOSCOPY  2019      Patient Active Problem List   Diagnosis    Seizures     GERD (gastroesophageal reflux disease)    Mixed hyperlipidemia    HTN (hypertension), benign        Objective:      Physical Exam  Constitutional:       Appearance: Normal appearance.   HENT:      Head: Normocephalic.   Cardiovascular:      Rate and Rhythm: Normal rate and regular rhythm.      Pulses: Normal pulses.      Heart sounds: Normal heart sounds.   Pulmonary:      Effort: Pulmonary effort is normal.      Breath sounds: Normal breath sounds.   Abdominal:      General: Abdomen is flat. Bowel sounds are normal.      Palpations: Abdomen is soft.   Musculoskeletal:         General: Normal range of motion.      Cervical back: Normal range of motion and neck supple.   Skin:     General: Skin is warm and dry.   Neurological:      General: No focal deficit present.      Mental Status: She is alert and oriented to person, place, and time.   Psychiatric:         Mood and Affect: Mood normal.         Assessment:       Problem List Items Addressed This Visit          Cardiac/Vascular    Mixed hyperlipidemia    Relevant Orders    Lipid Panel (Completed)    HTN (hypertension), benign    Relevant Orders    Comprehensive Metabolic Panel (Completed)    CBC Auto Differential (Completed)    Hemoglobin A1C (Completed)     Other Visit Diagnoses       Encounter for annual physical exam    -  Primary    Grief reaction        Relevant Orders    Ambulatory referral/consult to Primary Care Behavioral Health (Non-Opioids)    Elevated TSH        Relevant Orders    TSH (Completed)    Screening mammogram for breast cancer        Relevant Orders    Mammo Digital Screening Bilat    Encounter for colorectal cancer screening        Relevant Orders    Ambulatory referral/consult to Endo Procedure             Plan:         Adri was seen today for annual exam.    Diagnoses and all orders for this visit:    Encounter for annual physical exam  Health Maintenance:  Colon Cancer Screening:  Scheduled  Mammogram:  Ordered today  HIV:  negative 2022    Hep C: negative 2022   Lipids: normal in 10/2022   Pap Smear: PAP normal in 11/2022 due again in 2025   Vaccines:  Up-to-date  Annual wellness exam completed.     All medications, histories, and concerns reviewed, reconciled, and addressed.     Appropriate Screenings per pt's sex and age have been reviewed and discussed with pt.     Grief reaction  -     Ambulatory referral/consult to Primary Care Behavioral Health (Non-Opioids); Future  -     EScitalopram oxalate (LEXAPRO) 10 MG tablet; Take 1 tablet (10 mg total) by mouth once daily.  Start Lexapro and also placed referral to behavioral therapy for grief counseling    Mixed hyperlipidemia  -     Lipid Panel; Future  Continue statin check lipids.    Elevated TSH  -     TSH; Future  TSH has been elevated in the past.  We will check it today    HTN (hypertension), benign  Well-controlled on current meds    Screening mammogram for breast cancer  -     Mammo Digital Screening Bilat; Future    Encounter for colorectal cancer screening  -     Ambulatory referral/consult to Endo Procedure ; Future            Sandrita Hussein MD   Internal Medicine   Primary Care

## 2025-02-03 NOTE — PATIENT INSTRUCTIONS
Drink 80 ounces of water daily.   Exercise daily.   Start fiber supplement ( metamucil or benefiber)   Daily probiotic.   Daily stool softer.   Take miralax if no BM in 3 days.

## 2025-02-06 ENCOUNTER — TELEPHONE (OUTPATIENT)
Dept: ENDOSCOPY | Facility: HOSPITAL | Age: 60
End: 2025-02-06

## 2025-02-06 ENCOUNTER — CLINICAL SUPPORT (OUTPATIENT)
Dept: ENDOSCOPY | Facility: HOSPITAL | Age: 60
End: 2025-02-06
Payer: COMMERCIAL

## 2025-02-06 VITALS — HEIGHT: 60 IN | BODY MASS INDEX: 38.48 KG/M2 | WEIGHT: 196 LBS

## 2025-02-06 DIAGNOSIS — Z12.12 ENCOUNTER FOR COLORECTAL CANCER SCREENING: ICD-10-CM

## 2025-02-06 DIAGNOSIS — Z12.11 ENCOUNTER FOR COLORECTAL CANCER SCREENING: ICD-10-CM

## 2025-02-06 RX ORDER — SODIUM, POTASSIUM,MAG SULFATES 17.5-3.13G
1 SOLUTION, RECONSTITUTED, ORAL ORAL DAILY
Qty: 1 KIT | Refills: 0 | Status: SHIPPED | OUTPATIENT
Start: 2025-02-06 | End: 2025-02-09

## 2025-02-06 NOTE — TELEPHONE ENCOUNTER
Referral for procedure from PAT appointment      Spoke to Adri to schedule procedure(s) Colonoscopy       Physician to perform procedure(s) Dr. TASH Salter  Date of Procedure (s) 03/17/25  Arrival Time 01:00 PM  Time of Procedure(s) 02:00 PM   Location of Procedure(s) [place] 4th Floor  Type of Rx Prep sent to patient: Suprep  Instructions provided to patient via [facility]    Patient was informed on the following information and verbalized understanding. Screening questionnaire reviewed with patient and complete. If procedure requires anesthesia, a responsible adult needs to be present to accompany the patient home, patient cannot drive after receiving anesthesia. Appointment details are tentative, especially check-in time. Patient will receive a prep-op call 7 days prior to confirm check-in time for procedure. If applicable the patient should contact their pharmacy to verify Rx for procedure prep is ready for pick-up. Patient was advised to call the scheduling department at [phone] if pharmacy states no Rx is available. Patient was advised to call the endoscopy scheduling department if any questions or concerns arise.      [facility] Scheduling Department

## 2025-02-06 NOTE — PRE-PROCEDURE INSTRUCTIONS
Referral for procedure from PAT appointment      Spoke to Adri to schedule procedure(s) Colonoscopy       Physician to perform procedure(s) Dr. TASH Salter  Date of Procedure (s) 03/17/25  Arrival Time 01:00 PM  Time of Procedure(s) 02:00 PM   Location of Procedure(s) [facility] [address]th Floor  Type of Rx Prep sent to patient: Suprep  Instructions provided to patient via [facility]    Patient was informed on the following information and verbalized understanding. Screening questionnaire reviewed with patient and complete. If procedure requires anesthesia, a responsible adult needs to be present to accompany the patient home, patient cannot drive after receiving anesthesia. Appointment details are tentative, especially check-in time. Patient will receive a prep-op call 7 days prior to confirm check-in time for procedure. If applicable the patient should contact their pharmacy to verify Rx for procedure prep is ready for pick-up. Patient was advised to call the scheduling department at [phone] if pharmacy states no Rx is available. Patient was advised to call the endoscopy scheduling department if any questions or concerns arise.      [facility] Scheduling Department

## 2025-03-17 ENCOUNTER — ANESTHESIA (OUTPATIENT)
Dept: ENDOSCOPY | Facility: HOSPITAL | Age: 60
End: 2025-03-17
Payer: COMMERCIAL

## 2025-03-17 ENCOUNTER — ANESTHESIA EVENT (OUTPATIENT)
Dept: ENDOSCOPY | Facility: HOSPITAL | Age: 60
End: 2025-03-17
Payer: COMMERCIAL

## 2025-03-17 ENCOUNTER — HOSPITAL ENCOUNTER (OUTPATIENT)
Facility: HOSPITAL | Age: 60
Discharge: HOME | End: 2025-03-17
Attending: COLON & RECTAL SURGERY | Admitting: COLON & RECTAL SURGERY
Payer: COMMERCIAL

## 2025-03-17 VITALS
RESPIRATION RATE: 16 BRPM | DIASTOLIC BLOOD PRESSURE: 78 MMHG | BODY MASS INDEX: 35.67 KG/M2 | WEIGHT: 181.69 LBS | HEIGHT: 60 IN | OXYGEN SATURATION: 100 % | SYSTOLIC BLOOD PRESSURE: 128 MMHG | TEMPERATURE: 98 F | HEART RATE: 60 BPM

## 2025-03-17 DIAGNOSIS — Z12.11 SCREENING FOR MALIGNANT NEOPLASM OF COLON: Primary | ICD-10-CM

## 2025-03-17 PROCEDURE — 88305 TISSUE EXAM BY PATHOLOGIST: CPT | Mod: 26,,, | Performed by: PATHOLOGY

## 2025-03-17 PROCEDURE — 45380 COLONOSCOPY AND BIOPSY: CPT | Mod: 33 | Performed by: COLON & RECTAL SURGERY

## 2025-03-17 PROCEDURE — E9220 PRA ENDO ANESTHESIA: HCPCS | Mod: ,,,

## 2025-03-17 PROCEDURE — 37000009 HC ANESTHESIA EA ADD 15 MINS: Performed by: COLON & RECTAL SURGERY

## 2025-03-17 PROCEDURE — 88305 TISSUE EXAM BY PATHOLOGIST: CPT | Performed by: PATHOLOGY

## 2025-03-17 PROCEDURE — 45380 COLONOSCOPY AND BIOPSY: CPT | Mod: 33,,, | Performed by: COLON & RECTAL SURGERY

## 2025-03-17 PROCEDURE — 63600175 PHARM REV CODE 636 W HCPCS

## 2025-03-17 PROCEDURE — 37000008 HC ANESTHESIA 1ST 15 MINUTES: Performed by: COLON & RECTAL SURGERY

## 2025-03-17 PROCEDURE — 27201012 HC FORCEPS, HOT/COLD, DISP: Performed by: COLON & RECTAL SURGERY

## 2025-03-17 PROCEDURE — 25000003 PHARM REV CODE 250: Performed by: COLON & RECTAL SURGERY

## 2025-03-17 RX ORDER — LIDOCAINE HYDROCHLORIDE 20 MG/ML
INJECTION INTRAVENOUS
Status: DISCONTINUED | OUTPATIENT
Start: 2025-03-17 | End: 2025-03-17

## 2025-03-17 RX ORDER — SODIUM CHLORIDE 9 MG/ML
INJECTION, SOLUTION INTRAVENOUS CONTINUOUS
Status: DISCONTINUED | OUTPATIENT
Start: 2025-03-17 | End: 2025-03-17 | Stop reason: HOSPADM

## 2025-03-17 RX ORDER — PROPOFOL 10 MG/ML
VIAL (ML) INTRAVENOUS
Status: DISCONTINUED | OUTPATIENT
Start: 2025-03-17 | End: 2025-03-17

## 2025-03-17 RX ADMIN — PROPOFOL 50 MG: 10 INJECTION, EMULSION INTRAVENOUS at 12:03

## 2025-03-17 RX ADMIN — PROPOFOL 150 MCG/KG/MIN: 10 INJECTION, EMULSION INTRAVENOUS at 12:03

## 2025-03-17 RX ADMIN — LIDOCAINE HYDROCHLORIDE 60 MG: 20 INJECTION INTRAVENOUS at 12:03

## 2025-03-17 RX ADMIN — SODIUM CHLORIDE: 0.9 INJECTION, SOLUTION INTRAVENOUS at 11:03

## 2025-03-17 NOTE — H&P
COLONOSCOPY HISTORY AND PHYSICAL EXAM    Procedure : Colonoscopy      INDICATIONS: personal history of colon polyps    Family Hx of CRC: None    Last Colonoscopy:  8 years ago (per pt)  Findings: ? Couple small polyps (per pt)       Past Medical History:   Diagnosis Date    Arthritis     GERD (gastroesophageal reflux disease)     HTN (hypertension), benign     Mixed hyperlipidemia     Seizures      Sedation Problems: NO  Family History   Problem Relation Name Age of Onset    Hypertension Mother Michelle Jacques     Diabetes Mother Michelle Jacques     BRCA 1/2 Mother Michelle Jacques     No Known Problems Father       Fam Hx of Sedation Problems: NO  Social History[1]    Review of Systems - Negative except   Respiratory ROS: no dyspnea  Cardiovascular ROS: no exertional chest pain  Gastrointestinal ROS: NO abdominal discomfort,  NO rectal bleeding  Musculoskeletal ROS: no muscular pain  Neurological ROS: no recent stroke    Physical Exam:  /73   Pulse 82   Temp 97.5 °F (36.4 °C)   Resp 15   Ht 5' (1.524 m)   Wt 82.4 kg (181 lb 10.5 oz)   SpO2 99%   Breastfeeding No   BMI 35.48 kg/m²   General: no distress  Head: normocephalic  Mallampati Score   Neck: supple, symmetrical, trachea midline  Lungs:  clear to auscultation bilaterally and normal respiratory effort  Heart: regular rate and rhythm and no murmur  Abdomen: soft, non-tender non-distented; bowel sounds normal; no masses,  no organomegaly  Extremities: no cyanosis or edema, or clubbing    PLAN  COLONOSCOPY.    SedationPlan :MAC    The details of the procedure, the possible need for biopsy or polypectomy and the potential risks including bleeding, perforation, missed polyps were discussed in detail.         [1]   Social History  Socioeconomic History    Marital status:    Tobacco Use    Smoking status: Former     Current packs/day: 0.00     Average packs/day: 0.3 packs/day for 36.0 years (9.0 ttl pk-yrs)     Types: Cigarettes     Quit date: 2025      Years since quittin.2    Smokeless tobacco: Former   Substance and Sexual Activity    Alcohol use: Not Currently    Drug use: Never    Sexual activity: Yes     Partners: Male

## 2025-03-17 NOTE — PROVATION PATIENT INSTRUCTIONS
Discharge Summary/Instructions after an Endoscopic Procedure  Patient Name: Adri Liriano  Patient MRN: [id number]  Patient YOB: 1965 Monday, March 17, 2025  Екатерина Salter MD  Dear patient,  As a result of recent federal legislation (The Federal Cures Act), you may   receive lab or pathology results from your procedure in your [facility]   account before your physician is able to contact you. Your physician or   their representative will relay the results to you with their   recommendations at their soonest availability.  Thank you,  RESTRICTIONS:  During your procedure today, you received medications for sedation.  These   medications may affect your judgment, balance and coordination.  Therefore,   for 24 hours, you have the following restrictions:   - DO NOT drive a car, operate machinery, make legal/financial decisions,   sign important papers or drink alcohol.    ACTIVITY:  Today: no heavy lifting, straining or running due to procedural   sedation/anesthesia.  The following day: return to full activity including work.  DIET:  Eat and drink normally unless instructed otherwise.     TREATMENT FOR COMMON SIDE EFFECTS:  - Mild abdominal pain, nausea, belching, bloating or excessive gas:  rest,   eat lightly and use a heating pad.  - Sore Throat: treat with throat lozenges and/or gargle with warm salt   water.  - Because air was used during the procedure, expelling large amounts of air   from your rectum or belching is normal.  - If a bowel prep was taken, you may not have a bowel movement for 1-3 days.    This is normal.  SYMPTOMS TO WATCH FOR AND REPORT TO YOUR PHYSICIAN:  1. Abdominal pain or bloating, other than gas cramps.  2. Chest pain.  3. Back pain.  4. Signs of infection such as: chills or fever occurring within 24 hours   after the procedure.  5. Rectal bleeding, which would show as bright red, maroon, or black stools.   (A tablespoon of blood from the rectum is not serious, especially  if   hemorrhoids are present.)  6. Vomiting.  7. Weakness or dizziness.  GO DIRECTLY TO THE NEAREST EMERGENCY ROOM IF YOU HAVE ANY OF THE FOLLOWING:      Difficulty breathing              Chills and/or fever over 101 F   Persistent vomiting and/or vomiting blood   Severe abdominal pain   Severe chest pain   Black, tarry stools   Bleeding- more than one tablespoon   Any other symptom or condition that you feel may need urgent attention  Your doctor recommends these additional instructions:  If any biopsies were taken, your doctors clinic will contact you in 1 to 2   weeks with any results.  - Discharge patient to home.   - Resume previous diet.   - Continue present medications.   - Await pathology results.   - Repeat colonoscopy date to be determined after pending pathology results   are reviewed for surveillance.   - Written discharge instructions were provided to the patient.   - The signs and symptoms of potential delayed complications were discussed   with the patient.   - Patient has a contact number available for emergencies.   - Return to normal activities tomorrow.  For questions, problems or results please call your physician - Екатерина Salter MD at Work:  [phone].  [facility], EMERGENCY ROOM PHONE NUMBER: [phone]  IF A COMPLICATION OR EMERGENCY SITUATION ARISES AND YOU ARE UNABLE TO REACH   YOUR PHYSICIAN - GO DIRECTLY TO THE EMERGENCY ROOM.  Екатерина Salter MD  3/17/2025 12:27:01 PM  This report has been verified and signed electronically.  Dear patient,  As a result of recent federal legislation (The Federal Cures Act), you may   receive lab or pathology results from your procedure in your [facility]   account before your physician is able to contact you. Your physician or   their representative will relay the results to you with their   recommendations at their soonest availability.  Thank you,  [employer]ATION

## 2025-03-17 NOTE — TRANSFER OF CARE
Anesthesia Transfer of Care Note    Patient: Adri Liriano    Procedure(s) Performed: Procedure(s) (LRB):  COLONOSCOPY, SCREENING, LOW RISK PATIENT (N/A)    Patient location: GI    Anesthesia Type: general    Transport from OR: Transported from OR on 6-10 L/min O2 by face mask with adequate spontaneous ventilation    Post pain: adequate analgesia    Post assessment: no apparent anesthetic complications and tolerated procedure well    Post vital signs: stable    Level of consciousness: sedated    Nausea/Vomiting: no nausea/vomiting    Complications: none    Transfer of care protocol was followed      Last vitals: Visit Vitals  /66   Pulse 66   Temp 36.6 °C (97.9 °F)   Resp 16   Ht 5' (1.524 m)   Wt 82.4 kg (181 lb 10.5 oz)   SpO2 98%   Breastfeeding No   BMI 35.48 kg/m²

## 2025-03-17 NOTE — ANESTHESIA PREPROCEDURE EVALUATION
03/17/2025  Adri Liriano is a 60 y.o., female.    Past Medical History:   Diagnosis Date    Arthritis     GERD (gastroesophageal reflux disease)     HTN (hypertension), benign     Mixed hyperlipidemia     Seizures       Past Surgical History:   Procedure Laterality Date    UPPER GASTROINTESTINAL ENDOSCOPY  2019          Pre-op Assessment    I have reviewed the Patient Summary Reports.     I have reviewed the Nursing Notes.    I have reviewed the Medications.     Review of Systems  Anesthesia Hx:  No problems with previous Anesthesia             Denies Family Hx of Anesthesia complications.    Denies Personal Hx of Anesthesia complications.                    Hematology/Oncology:  Hematology Normal   Oncology Normal                                   EENT/Dental:  EENT/Dental Normal           Cardiovascular:     Hypertension                                          Pulmonary:  Pulmonary Normal                       Renal/:  Renal/ Normal                 Hepatic/GI:     GERD, well controlled                Musculoskeletal:  Musculoskeletal Normal                Neurological:       Seizures                                Endocrine:  Endocrine Normal            Dermatological:  Skin Normal    Psych:  Psychiatric Normal                    Physical Exam  General: Well nourished    Airway:  Mallampati: II / I  Mouth Opening: Normal  TM Distance: Normal  Tongue: Normal  Neck ROM: Normal ROM    Dental:  Intact        Anesthesia Plan  Type of Anesthesia, risks & benefits discussed:    Anesthesia Type: Gen Natural Airway  Intra-op Monitoring Plan: Standard ASA Monitors  Induction:  IV  Informed Consent: Informed consent signed with the Patient and all parties understand the risks and agree with anesthesia plan.  All questions answered.   ASA Score: 2  Day of Surgery Review of History & Physical: H&P Update  referred to the surgeon/provider.    Ready For Surgery From Anesthesia Perspective.     .

## 2025-03-17 NOTE — ANESTHESIA POSTPROCEDURE EVALUATION
Anesthesia Post Evaluation    Patient: Adri Liriano    Procedure(s) Performed: Procedure(s) (LRB):  COLONOSCOPY, SCREENING, LOW RISK PATIENT (N/A)    Final Anesthesia Type: general      Patient location during evaluation: PACU  Patient participation: Yes- Able to Participate  Level of consciousness: awake and alert  Post-procedure vital signs: reviewed and stable  Pain management: adequate  Airway patency: patent    PONV status at discharge: No PONV  Anesthetic complications: no      Cardiovascular status: blood pressure returned to baseline  Respiratory status: unassisted  Hydration status: euvolemic  Follow-up not needed.              Vitals Value Taken Time   /78 03/17/25 13:00   Temp 36.6 °C (97.9 °F) 03/17/25 12:30   Pulse 60 03/17/25 13:00   Resp 16 03/17/25 13:00   SpO2 100 % 03/17/25 13:00         Event Time   Out of Recovery 13:11:03         Pain/Lorene Score: Lorene Score: 10 (3/17/2025  1:00 PM)

## 2025-03-18 LAB
FINAL PATHOLOGIC DIAGNOSIS: NORMAL
GROSS: NORMAL
Lab: NORMAL

## 2025-03-19 ENCOUNTER — RESULTS FOLLOW-UP (OUTPATIENT)
Dept: SURGERY | Facility: CLINIC | Age: 60
End: 2025-03-19

## 2025-05-14 ENCOUNTER — OFFICE VISIT (OUTPATIENT)
Dept: INTERNAL MEDICINE | Facility: CLINIC | Age: 60
End: 2025-05-14
Payer: COMMERCIAL

## 2025-05-14 VITALS
SYSTOLIC BLOOD PRESSURE: 130 MMHG | HEART RATE: 74 BPM | OXYGEN SATURATION: 99 % | HEIGHT: 60 IN | WEIGHT: 198.19 LBS | DIASTOLIC BLOOD PRESSURE: 67 MMHG | BODY MASS INDEX: 38.91 KG/M2

## 2025-05-14 DIAGNOSIS — E78.2 MIXED HYPERLIPIDEMIA: ICD-10-CM

## 2025-05-14 DIAGNOSIS — R56.9 SEIZURES: ICD-10-CM

## 2025-05-14 DIAGNOSIS — F43.20 GRIEF REACTION: Primary | ICD-10-CM

## 2025-05-14 DIAGNOSIS — I10 HTN (HYPERTENSION), BENIGN: ICD-10-CM

## 2025-05-14 PROCEDURE — 99999 PR PBB SHADOW E&M-EST. PATIENT-LVL III: CPT | Mod: PBBFAC,,, | Performed by: INTERNAL MEDICINE

## 2025-05-14 PROCEDURE — 3078F DIAST BP <80 MM HG: CPT | Mod: CPTII,S$GLB,, | Performed by: INTERNAL MEDICINE

## 2025-05-14 PROCEDURE — 3075F SYST BP GE 130 - 139MM HG: CPT | Mod: CPTII,S$GLB,, | Performed by: INTERNAL MEDICINE

## 2025-05-14 PROCEDURE — 3044F HG A1C LEVEL LT 7.0%: CPT | Mod: CPTII,S$GLB,, | Performed by: INTERNAL MEDICINE

## 2025-05-14 PROCEDURE — 99213 OFFICE O/P EST LOW 20 MIN: CPT | Mod: S$GLB,,, | Performed by: INTERNAL MEDICINE

## 2025-05-14 PROCEDURE — 3008F BODY MASS INDEX DOCD: CPT | Mod: CPTII,S$GLB,, | Performed by: INTERNAL MEDICINE

## 2025-05-14 RX ORDER — ESCITALOPRAM OXALATE 10 MG/1
10 TABLET ORAL DAILY
Qty: 90 TABLET | Refills: 3 | Status: SHIPPED | OUTPATIENT
Start: 2025-05-14 | End: 2025-05-14

## 2025-05-14 RX ORDER — LEVETIRACETAM 1000 MG/1
1000 TABLET ORAL EVERY 12 HOURS
Qty: 60 TABLET | Refills: 3 | Status: SHIPPED | OUTPATIENT
Start: 2025-05-14

## 2025-05-14 RX ORDER — ATORVASTATIN CALCIUM 40 MG/1
40 TABLET, FILM COATED ORAL DAILY
Qty: 90 TABLET | Refills: 2 | Status: SHIPPED | OUTPATIENT
Start: 2025-05-14

## 2025-05-14 RX ORDER — CHLORTHALIDONE 25 MG/1
25 TABLET ORAL DAILY
Qty: 90 TABLET | Refills: 1 | Status: SHIPPED | OUTPATIENT
Start: 2025-05-14

## 2025-05-14 NOTE — PROGRESS NOTES
Subjective:       Patient ID: Adri Liriano is a 60 y.o. female.    Chief Complaint: Follow-up and Medication Refill    During previous visit we discussed her depression due to grief reaction as her  was terminally ill she states today that he did passed away in February.  We prescribed Lexapro at the time to help her through the grieving process.  She states she took it for a few weeks but has now come off it and states she feels like she is coping well and has a good support system.  No longer taking the medication.           GERD/Dysphagia: doing well now. Had EGD in [place] in 2015 with dilatation. Had been doing well but lately has been having issues swallowing again.      HTN: well controlled on chlorthalidone      HLD: well controlled on stain      Epilepsy:  recently established with neurology here. She has now weaned off Dilantin and is now only on keppra BID     Current smoker: 9 pack year hx. Smokes 3-4 cigarettes per day      Health Maintenance:  Colon Cancer Screening:  Scheduled  Mammogram:  Ordered today  HIV: negative 2022    Hep C: negative 2022   Lipids: normal in 10/2022   Pap Smear: PAP normal in 11/2022 due again in 2025   Vaccines:  Up-to-date           Review of Systems   Constitutional:  Negative for activity change, appetite change and chills.   HENT:  Negative for ear pain, sinus pressure/congestion and sneezing.    Respiratory:  Negative for cough and shortness of breath.    Cardiovascular:  Negative for chest pain, palpitations and leg swelling.   Gastrointestinal:  Positive for constipation. Negative for abdominal distention, abdominal pain, diarrhea, nausea and vomiting.   Genitourinary:  Negative for dysuria and hematuria.   Musculoskeletal:  Negative for arthralgias, back pain and myalgias.   Neurological:  Negative for dizziness and headaches.   Psychiatric/Behavioral:  Negative for agitation. The patient is not nervous/anxious.            Past Medical History:   Diagnosis  Date    Arthritis     GERD (gastroesophageal reflux disease)     HTN (hypertension), benign     Mixed hyperlipidemia     Seizures      Past Surgical History:   Procedure Laterality Date    COLONOSCOPY, SCREENING, LOW RISK PATIENT N/A 3/17/2025    Procedure: COLONOSCOPY, SCREENING, LOW RISK PATIENT;  Surgeon: Екатерина Salter MD;  Location: [address];  Service: Endoscopy;  Laterality: N/A;  ch/suprepext/portal/referringDr.Redo  3/7 precall complete. kw    UPPER GASTROINTESTINAL ENDOSCOPY  2019      Patient Active Problem List   Diagnosis    Seizures    GERD (gastroesophageal reflux disease)    Mixed hyperlipidemia    HTN (hypertension), benign        Objective:      Physical Exam  Constitutional:       Appearance: Normal appearance.   HENT:      Head: Normocephalic.   Cardiovascular:      Rate and Rhythm: Normal rate and regular rhythm.      Pulses: Normal pulses.      Heart sounds: Normal heart sounds.   Pulmonary:      Effort: Pulmonary effort is normal.      Breath sounds: Normal breath sounds.   Abdominal:      General: Abdomen is flat. Bowel sounds are normal.      Palpations: Abdomen is soft.   Musculoskeletal:         General: Normal range of motion.      Cervical back: Normal range of motion and neck supple.   Skin:     General: Skin is warm and dry.   Neurological:      General: No focal deficit present.      Mental Status: She is alert and oriented to person, place, and time.   Psychiatric:         Mood and Affect: Mood normal.         Assessment:       Problem List Items Addressed This Visit          Neuro    Seizures    Relevant Medications    levETIRAcetam (KEPPRA) 1000 MG tablet       Cardiac/Vascular    Mixed hyperlipidemia    Relevant Medications    atorvastatin (LIPITOR) 40 MG tablet    HTN (hypertension), benign    Relevant Medications    chlorthalidone (HYGROTEN) 25 MG Tab     Other Visit Diagnoses         Grief reaction    -  Primary            Plan:       Grief reaction  -      Discontinue: EScitalopram oxalate (LEXAPRO) 10 MG tablet; Take 1 tablet (10 mg total) by mouth once daily.  Dispense: 90 tablet; Refill: 3  Is doing better.  She has stopped taking Lexapro and states she feels she is doing well with support from her family and friends.    Mixed hyperlipidemia  -     atorvastatin (LIPITOR) 40 MG tablet; Take 1 tablet (40 mg total) by mouth once daily.  Dispense: 90 tablet; Refill: 2  Recent lipids were stable.  Continue statin.    HTN (hypertension), benign  -     chlorthalidone (HYGROTEN) 25 MG Tab; Take 1 tablet (25 mg total) by mouth once daily.  Dispense: 90 tablet; Refill: 1  Blood pressure is well-controlled on current medications.  Medications refilled today.    Seizures  -     levETIRAcetam (KEPPRA) 1000 MG tablet; Take 1 tablet (1,000 mg total) by mouth every 12 (twelve) hours.  Dispense: 60 tablet; Refill: 3  No recent seizures.              Sandrita Hussein MD   Internal Medicine   Primary Care

## 2025-05-30 ENCOUNTER — HOSPITAL ENCOUNTER (OUTPATIENT)
Dept: RADIOLOGY | Facility: HOSPITAL | Age: 60
Discharge: HOME | End: 2025-05-30
Attending: INTERNAL MEDICINE
Payer: COMMERCIAL

## 2025-05-30 DIAGNOSIS — Z12.31 SCREENING MAMMOGRAM FOR BREAST CANCER: ICD-10-CM

## 2025-05-30 PROCEDURE — 77063 BREAST TOMOSYNTHESIS BI: CPT | Mod: 26,,, | Performed by: RADIOLOGY

## 2025-05-30 PROCEDURE — 77067 SCR MAMMO BI INCL CAD: CPT | Mod: 26,,, | Performed by: RADIOLOGY

## 2025-05-30 PROCEDURE — 77067 SCR MAMMO BI INCL CAD: CPT | Mod: TC

## 2025-06-03 ENCOUNTER — RESULTS FOLLOW-UP (OUTPATIENT)
Dept: INTERNAL MEDICINE | Facility: CLINIC | Age: 60
End: 2025-06-03

## 2025-08-01 ENCOUNTER — OFFICE VISIT (OUTPATIENT)
Dept: PRIMARY CARE CLINIC | Facility: CLINIC | Age: 60
End: 2025-08-01
Payer: COMMERCIAL

## 2025-08-01 VITALS
HEIGHT: 60 IN | HEART RATE: 74 BPM | WEIGHT: 205.56 LBS | SYSTOLIC BLOOD PRESSURE: 109 MMHG | BODY MASS INDEX: 40.36 KG/M2 | DIASTOLIC BLOOD PRESSURE: 73 MMHG | OXYGEN SATURATION: 98 %

## 2025-08-01 DIAGNOSIS — L72.3 SEBACEOUS CYST: Primary | ICD-10-CM

## 2025-08-01 RX ORDER — CLINDAMYCIN HYDROCHLORIDE 300 MG/1
300 CAPSULE ORAL EVERY 6 HOURS
Qty: 28 CAPSULE | Refills: 0 | Status: SHIPPED | OUTPATIENT
Start: 2025-08-01 | End: 2025-08-08

## 2025-08-01 RX ORDER — MUPIROCIN 20 MG/G
OINTMENT TOPICAL 3 TIMES DAILY
Qty: 22 G | Refills: 0 | Status: SHIPPED | OUTPATIENT
Start: 2025-08-01 | End: 2025-08-08

## 2025-08-01 RX ORDER — DOXYCYCLINE HYCLATE 100 MG
100 TABLET ORAL 2 TIMES DAILY
Qty: 14 TABLET | Refills: 0 | Status: SHIPPED | OUTPATIENT
Start: 2025-08-01 | End: 2025-08-08

## 2025-08-01 NOTE — PROGRESS NOTES
60-year-old woman with a history of seizures, disease of the heart (hyperlipidemia, hypertension), acid reflux, elevated BMI (40.15 kilograms/meter squared), with a complaint of a boil in her right inguinal area.      History of present illness and pertinent review of systems:  The patient has a 5 day history.  The patient does not know how it began.  The patient notes pain in the area.  She went to a pharmacy in obtained in ointment which draws the boil out.  She notes now that it has gotten larger and more painful since using it.  She believes there is drainage there.  She notes the drainage has a foul odor but does not see a caller to the drainage.  There was no fever chills or sweats.  She notes no swollen glands above the area in question in the right inguinal area.  The patient notes that she had something similar to this years ago.  The patient is not a diabetic.    Problem list, medication list, and allergies have been reviewed.  The patient has no known drug allergies.    Review of systems is otherwise negative     Physical Exam  Vitals and nursing note reviewed.   Constitutional:       General: She is not in acute distress.     Appearance: She is not ill-appearing, toxic-appearing or diaphoretic.      Comments: The patient looks uncomfortable with her right leg extended.   HENT:      Head: Atraumatic.      Nose: Nose normal.      Mouth/Throat:      Mouth: Mucous membranes are moist.      Pharynx: Oropharynx is clear.   Eyes:      General: No scleral icterus.     Conjunctiva/sclera: Conjunctivae normal.   Cardiovascular:      Rate and Rhythm: Normal rate and regular rhythm.      Pulses: Normal pulses.      Heart sounds: Normal heart sounds. No murmur heard.     No gallop.   Pulmonary:      Effort: Pulmonary effort is normal. No respiratory distress.      Breath sounds: Normal breath sounds. No wheezing, rhonchi or rales.   Abdominal:      General: Bowel sounds are normal.      Palpations: Abdomen is soft.       Tenderness: There is no abdominal tenderness.      Comments: There was no inguinal lymphadenopathy   Musculoskeletal:         General: Tenderness present.      Cervical back: Neck supple.      Right lower leg: No edema.      Left lower leg: No edema.      Comments: Right inguinal area [place] in the area of the sebaceous cyst but a proximally 2-4 cm around.   Skin:     General: Skin is warm.      Findings: Lesion present.      Comments: Large sebaceous cyst and phlegmon in the right inguinal area suggesting an ingrown hair.  The size is approximately that of a golf ball.  There is an opening which is draining clear serous fluid.  The fluid is malodorous the patient on palpation was extremely tender in that area.   Neurological:      General: No focal deficit present.      Mental Status: She is alert and oriented to person, place, and time.   Psychiatric:         Mood and Affect: Mood normal.         Behavior: Behavior normal.      Assessment/plan:  Infected sebaceous cyst:  The patient has a large cyst which is infected and draining but only superficially.  The cavity of that cyst is not open or draining pus in his just the skin around that is weeping fluid.  This is very large and in an area which would be difficult to drain in the office.  The patient has no fever or inguinal lymphadenopathy.  Plan:  Reviewed above with patient as well as explaining why the should not be drained in the office.    The patient wish to trial of antibiotics and she was prescribed clindamycin which has both Gram-positive and Gram-negative coverage as well as anaerobic coverage as well as doxycycline.  Moist heat or soak in a bathtub 3 times a day after which the patient should place mupirocin on the opening that has currently there.  If the pain gets worse or the cyst enlarges or the patient develops a fever she has been told to go to the emergency room for further evaluation and for possible surgery.

## 2025-08-01 NOTE — PATIENT INSTRUCTIONS
Thank you for visiting   You have an infective sebaceous cyst most likely due to an ingrown hair.  Because of its location and size it would not be in your best interest for me to try to drain that.  The following are my recommendations:   I have prescribed 2 antibiotics clindamycin and doxycycline to cover staph and anaerobes.  The clindamycin is taken 4 times a day in the doxycycline twice a day.  The doxycycline should be taken at breakfast time and dinnertime and you should not lay down after taking for at least 1 hour  I have also prescribed mupirocin for you to be used 3 times a day.  It would be best if you sat in a hot tub or applied moist heat with a warm washcloth for 15-20 minutes before putting the mupirocin on.  If the symptoms worsen either it is growing what you have noticed or become more painful or you have developed a temperature above 101 need to go to the emergency room where it can be drained properly, usually by a surgeon  For pain and discomfort I recommend using 2 Alleve with breakfast and dinner and Tylenol arthritis 2 with lunch and bedtime  To protect your stomach I would recommend the following: A probiotic twice daily and either Pepcid or omeprazole